# Patient Record
Sex: MALE | Race: WHITE | URBAN - METROPOLITAN AREA
[De-identification: names, ages, dates, MRNs, and addresses within clinical notes are randomized per-mention and may not be internally consistent; named-entity substitution may affect disease eponyms.]

---

## 2017-02-25 ENCOUNTER — HOSPITAL ENCOUNTER (INPATIENT)
Facility: CLINIC | Age: 27
LOS: 5 days | Discharge: HOME | DRG: 885 | End: 2017-03-02
Attending: EMERGENCY MEDICINE | Admitting: PSYCHIATRY & NEUROLOGY
Payer: COMMERCIAL

## 2017-02-25 DIAGNOSIS — F23 ACUTE PSYCHOSIS (H): ICD-10-CM

## 2017-02-25 DIAGNOSIS — F10.10 ALCOHOL ABUSE: ICD-10-CM

## 2017-02-25 PROBLEM — F32.A DEPRESSION: Status: ACTIVE | Noted: 2017-02-25

## 2017-02-25 PROCEDURE — 25000132 ZZH RX MED GY IP 250 OP 250 PS 637: Performed by: PSYCHIATRY & NEUROLOGY

## 2017-02-25 PROCEDURE — 12400006 ZZH R&B MH INTERMEDIATE

## 2017-02-25 PROCEDURE — 99285 EMERGENCY DEPT VISIT HI MDM: CPT | Mod: 25

## 2017-02-25 PROCEDURE — 90791 PSYCH DIAGNOSTIC EVALUATION: CPT

## 2017-02-25 RX ORDER — OLANZAPINE 10 MG/2ML
5-10 INJECTION, POWDER, FOR SOLUTION INTRAMUSCULAR EVERY 6 HOURS PRN
Status: DISCONTINUED | OUTPATIENT
Start: 2017-02-25 | End: 2017-02-27

## 2017-02-25 RX ORDER — HYDROXYZINE HYDROCHLORIDE 25 MG/1
25-50 TABLET, FILM COATED ORAL EVERY 4 HOURS PRN
Status: DISCONTINUED | OUTPATIENT
Start: 2017-02-25 | End: 2017-03-02 | Stop reason: HOSPADM

## 2017-02-25 RX ORDER — OLANZAPINE 5 MG/1
5 TABLET, ORALLY DISINTEGRATING ORAL 2 TIMES DAILY
Status: DISCONTINUED | OUTPATIENT
Start: 2017-02-25 | End: 2017-03-02 | Stop reason: HOSPADM

## 2017-02-25 RX ORDER — OLANZAPINE 5 MG/1
5-10 TABLET, ORALLY DISINTEGRATING ORAL EVERY 6 HOURS PRN
Status: DISCONTINUED | OUTPATIENT
Start: 2017-02-25 | End: 2017-02-27

## 2017-02-25 RX ADMIN — OLANZAPINE 5 MG: 5 TABLET, ORALLY DISINTEGRATING ORAL at 22:30

## 2017-02-25 RX ADMIN — OLANZAPINE 5 MG: 5 TABLET, ORALLY DISINTEGRATING ORAL at 11:47

## 2017-02-25 ASSESSMENT — ENCOUNTER SYMPTOMS
HALLUCINATIONS: 0
MYALGIAS: 0
COUGH: 0
FEVER: 0
HEADACHES: 0

## 2017-02-25 ASSESSMENT — ACTIVITIES OF DAILY LIVING (ADL)
GROOMING: INDEPENDENT
ORAL_HYGIENE: INDEPENDENT
DRESS: STREET CLOTHES
LAUNDRY: WITH SUPERVISION

## 2017-02-25 NOTE — IP AVS SNAPSHOT
[place] [address] [place] [address]    Phone:  [phone]                                       After Visit Summary   2/25/2017    Pepe Robertson    MRN: [id number]           After Visit Summary Signature Page     I have received my discharge instructions, and my questions have been answered. I have discussed any challenges I see with this plan with the nurse or doctor.    ..........................................................................................................................................  Patient/Patient Representative Signature      ..........................................................................................................................................  Patient Representative Print Name and Relationship to Patient    ..................................................               ................................................  Date                                            Time    ..........................................................................................................................................  Reviewed by Signature/Title    ...................................................              ..............................................  Date                                                            Time

## 2017-02-25 NOTE — PROGRESS NOTES
"Patient is guarded during interview and denies any psychosis,paranoia,depression,SI. And also states he drinks twice weekly 3-4 drinks at a time. Patient signed JOHN for his parents and mother states that \"Tiburcio\" lives with ,her, her  and his brother. Mother states that Tiburcio minimizes his symptoms and is in denial of his illness. She states that he drinks on a daily basis about 3-5 drinks. She also states he sees spirits and bullet holes in the wall and is fearful they will get shot. Also he has been talking about government conspiracies. Patient denies all of this. Pt took Zyprexa at 11:48 and has been compliant  "

## 2017-02-25 NOTE — PROGRESS NOTES
Boots w/string   Black sweat pants w/string   Green t-shirt  Gray hoodie w/string     Admission:    Name:____________________________________Date:_________________    Discharge:    Name:____________________________________Date:___________________

## 2017-02-25 NOTE — IP AVS SNAPSHOT
MRN:[id number]                      After Visit Summary   2/25/2017    Pepe Robertson    MRN: [id number]           Thank you!     Thank you for choosing [facility] for your care. Our goal is always to provide you with excellent care.        Patient Information     Date Of Birth          1990        About your hospital stay     You were admitted on:  February 25, 2017 You last received care in the:  [facility]    You were discharged on:  March 2, 2017       Who to Call     For medical emergencies, please call 911.  For non-urgent questions about your medical care, please call your primary care provider or clinic, None          Attending Provider     Provider Specialty    Hebert Abdalla MD Emergency Medicine    [facility]    Kate Garber MD Psychiatry       Primary Care Provider    Physician No Ref-Primary       No address on file        Further instructions from your care team       Behavioral Discharge Planning and Instructions    Summary: Admitted to hospital with paranoia and aggression toward his parents.    Main Diagnosis: Psychotic disorder, unspecified;alcohol use disorder, severe.     Major Treatments, Procedures and Findings: psychiatric assessment    Symptoms to Report: feeling more aggressive, increased confusion or mood getting worse    Lifestyle Adjustment: Sober lifestyle. Recommend AA attendance. Follow up for mental health needs with therapy and medication management.    Psychiatry Follow-up:    Therapy intake with Don Painter- appointment 3/13/17 (Monday) at 10:30 am. Bring insurance card or paper validation of insurance to first appointment.  Dr. Alcaraz, psychiatrist- appointment 4/27/17 (Thursday) at 10 am- 1 hour appointment.  [facility] [address] [place] [address] [phone] fax: [phone]      Resources:   Crisis Intervention: 895.610.1550 or 348-068-4413  "(TTY: 334.391.9650).  Call anytime for help.  National [place] on Mental Illness (www.[place].albert.org): 321.738.7403 or 060-963-9834.  Alcoholics Anonymous (www.alcoholics-anonymous.org): Check your phone book for your local chapter.  National Suicide Prevention Line (www.mentalhealthmn.org): 051-121-RQHU (5087)    General Medication Instructions:   See your medication sheet(s) for instructions.   Take all medicines as directed.  Make no changes unless your doctor suggests them.   Go to all your doctor visits.  Be sure to have all your required lab tests. This way, your medicines can be refilled on time.  Do not use any drugs not prescribed by your doctor.  Avoid alcohol.      Pending Results     No orders found from 2/23/2017 to 2/26/2017.            Statement of Approval     Ordered          03/01/17 1654  I have reviewed and agree with all the recommendations and orders detailed in this document.  EFFECTIVE NOW     Approved and electronically signed by:  Kate Garber MD             Admission Information     Date & Time Provider Department Dept. Phone    2/25/2017 Kate Garber MD [facility] [phone]      Your Vitals Were     Blood Pressure Pulse Temperature Respirations Height Weight    120/80 97 97.6  F (36.4  C) (Oral) 16 1.702 m (5' 7\") 82.5 kg (181 lb 12.8 oz)    Pulse Oximetry BMI (Body Mass Index)                98% 28.47 kg/m2          [employer] Information     [employer] lets you send messages to your doctor, view your test results, renew your prescriptions, schedule appointments and more. To sign up, go to [email or web address] . Click on \"Log in\" on the left side of the screen, which will take you to the Welcome page. Then click on \"Sign up Now\" on the right side of the page.     You will be asked to enter the access code listed below, as well as some personal information. Please follow the directions to create your username and password.     Your access " code is: [email or web address]  Expires: 2017  8:43 AM     Your access code will  in 90 days. If you need help or a new code, please call your [facility] clinic or [phone].        Care EveryWhere ID     This is your Care EveryWhere ID. This could be used by other organizations to access your [facility] medical records  [id number]           Review of your medicines      START taking        Dose / Directions    OLANZapine 10 MG tablet   Commonly known as:  zyPREXA   Used for:  Acute psychosis        Dose:  10 mg   Take 1 tablet (10 mg) by mouth At Bedtime   Quantity:  30 tablet   Refills:  1            Where to get your medicines      These medications were sent to [facility] - [address]  [address], [place] [address]     Phone:  [phone]     OLANZapine 10 MG tablet                Protect others around you: Learn how to safely use, store and throw away your medicines at www.disposemymeds.org.             Medication List: This is a list of all your medications and when to take them. Check marks below indicate your daily home schedule. Keep this list as a reference.      Medications           Morning Afternoon Evening Bedtime As Needed    OLANZapine 10 MG tablet   Commonly known as:  zyPREXA   Take 1 tablet (10 mg) by mouth At Bedtime

## 2017-02-25 NOTE — ED NOTES
Bed: [facility]  Expected date:   Expected time:   Means of arrival:   Comments:  533 26M hold/intox/psych eval to [facility]

## 2017-02-25 NOTE — ED PROVIDER NOTES
"  History     Chief Complaint:  Psychiatric Evaluation     HPI   Pepe Robertson is a 26 year old male who presents to the emergency department today for a pyschiatric evaluation. Per EMS report the police were called to the patient's home by the parents. They stated that the patient was paranoid, talking about government conspiracies, and that he was aggressive and pushing his parents this evening. Patient's parents note that he used to smoke marijuana but thought this made him paranoid so he was prescribed Abilify. However, he stopped taking the Abilify when he stopped smoking the marijuana. Here in the ED, when asked why he is here the patient states that \"he was drinking alcohol and screamed\" and was then brought here. He has no pain, fever, body aches or headaches. He has not had a cough or cold recently. The patient denies any paranoia this evening and instead states \"his mom thinks he's crazy as usual.\" He is not suicidal or homicidal. He denies auditory or visual hallucinations. He does not smoke or use recreational drugs. He reports a history of marijuana use but \"not in years.\" He notes hypertension runs in the family but not depression or anxiety. He is not feeling stressed or anxious. He states that he does not drink everyday and he has no history of withdrawal symptoms or seizures.     Allergies:  No Known Drug Allergies     Medications:    The patient is currently on no regular medications.    Past Medical History:    History reviewed. No pertinent past medical history.    Past Surgical History:    History reviewed. No pertinent past surgical history.    Family History:    History reviewed. No pertinent family history.     Social History:  The patient was accompanied to the ED by EMS.  Smoking Status: Negative  Alcohol Use: Positive  Marital Status:  Single [1]     Review of Systems   Constitutional: Negative for fever.   Respiratory: Negative for cough.    Musculoskeletal: Negative for myalgias. " "  Neurological: Negative for headaches.   Psychiatric/Behavioral: Negative for hallucinations, self-injury and suicidal ideas.   All other systems reviewed and are negative.    Physical Exam   Vitals:   Patient Vitals for the past 24 hrs:   BP Temp Temp src Heart Rate Resp SpO2 Height Weight   02/25/17 0516 123/76 - - 111 - 96 % - -   02/25/17 0112 (!) 126/91 98.6  F (37  C) Oral 126 18 95 % 1.702 m (5' 7\") 81.6 kg (180 lb)        Physical Exam    Constitutional:  Appears well-developed and well-nourished. Cooperative.   HENT:   Head:    Atraumatic.   Mouth/Throat:   Oropharynx is without erythema or exudate and mucous     membranes are moist.   Eyes:    Conjunctivae normal and EOM are normal.      Pupils are equal, round, and reactive to light. No photophobia  Neck:    Normal range of motion. Neck supple.   Cardiovascular:  tachy rate, regular rhythm, normal heart sounds and radial and    dorsalis pedis pulses are 2+ and symmetric.    Pulmonary/Chest:  Effort normal and breath sounds normal.   Abdominal:   Soft. Bowel sounds are normal.      No splenomegaly or hepatomegaly. No tenderness. No rebound.   Musculoskeletal:  Normal range of motion. No edema and no tenderness.   Neurological:  Alert. Normal strength. No cranial nerve deficit. GCS 15.  Skin:    Skin is warm and dry.   Psychiatric:   Normal mood and flat affect. Well groomed, good eye contact. Normal speech    Emergency Department Course     Emergency Department Course:  Nursing notes and vitals reviewed.  I performed an exam of the patient as documented above.   The patient provided a urine sample here in the emergency department. This was sent for laboratory testing, findings above.  I discussed the treatment plan with the patient. They expressed understanding of this plan and consented to admission. I discussed the patient with Dr. Chan, who will admit the patient to a monitored bed for further evaluation and treatment.    Impression & Plan  "     Medical Decision Making:  Patient presents acutely intoxicated with alcohol. He admits to heavy frequent alcohol use that he agrees is not healthy. Tonight his parents called the police because he was being physically aggressive and seemed paranoid and confused.     On my initial exam the patient is calm and cooperative. He denies hallucinations, thoughts of harming himself or others. Tonight he admits to alcohol use and denies street drug use. EMS relayed the concerns of the parents. I had the DEC  evaluate the patient and he was also calm and appropriate for her. DEC talked to the patient s family however, and they relayed concerning symptoms that suggest acute psychosis. There is a family history of psychiatric illness on the patient s mother s side and the patient has had previous hospitalizations for psychosis that was thought to be substance abuse induced.     The family is concerned both for their own safety should the patient return home and the patient s safety. The patient has been unwilling to participate in outpatient treatment or take medications. I think he is at risk for further decompensation and is unable to adequately care for himself. He has impaired judgement. I placed him on a 72 hour hold. He is accepted for hospitalization by Dr. Chan.     Diagnosis:    ICD-10-CM    1. Acute psychosis F23    2. Alcohol abuse F10.10      Disposition:   Admission to Dr. Chan    Scribe Disclosure:  I, Isis Iliavinicius, am serving as a scribe at 1:11 AM on 2/25/2017 to document services personally performed by Hebert Abdalla MD, based on my observations and the provider's statements to me.    2/25/2017    EMERGENCY DEPARTMENT       Hebert Abdalla MD  02/26/17 0429

## 2017-02-25 NOTE — H&P
"DATE OF ADMISSION:  02/25/2017.      DATE OF SERVICE:  02/25/2017.       IDENTIFYING DATA AND REASON FOR REFERRAL:  Pepe Robertson is a 26-year-old man who reports he is single with no children who resides in [place] with his parents and younger brother.  He was brought in by EMS at the insistence of his parents because he was displaying aggression and psychotic behavior at home.  He is admitted on a 72-hour hold that expires 03/02/2017 at midnight.      CHIEF COMPLAINT:  \"My parents just overreacted.\"      HISTORY OF PRESENT ILLNESS:  Pepe Robertson reports that he was hospitalized in [place], [place] for drug-induced psychosis in 04/2014.  At that time he was abusing marijuana heavily and experienced psychotic symptoms.  He states he was on Abilify 30 mg daily for about 4 months between April and 08/2013.  Since then he states he has been off antipsychotic medications.  He claims he no longer uses cannabis but he continues to drink alcohol.  He states he drinks vodka or beer at least 3-4 times a week and averages 2-5 drinks each time he drinks.  He states he does drink to intoxication at times but denies that he has ever been through withdrawal seizures or delirium tremens.  He states he has never been through chemical use treatment and has never obtained a DWI or DUI.  He states he has never received any consequences of his use and denies that his use of alcohol has increased over time.  He reports that his father had gotten upset with him yesterday because he was intoxicated with vodka and claims his father started screaming at him.  Per his report, he screamed back at his father and this is what led to the  being called.  He alleges that his parents tend to overreact and are intolerant of alcohol use in their home and that is typically the source of their conflicts.  He reports that his younger brother suffers from schizophrenia and his parents tend to assume that he suffers from the same thing.  " However, collateral information obtained by the patient's mother, Omaira Robertson suggested the patient has been displaying unusual behavior and reports seeing bizarre things for a while.  Per her report, he told them that he quit [employer] where he previously worked because they only hire schizophrenics.  She went on to report that he has been having conversations with ghosts and tends to swear at people that they do not see whom he describes as ghosts.  She reports that even as far back as Lucio and Brayden, he blew up at his family for no apparent reason and then told his mother to get in the car and get the dog into the car so that they can leave.  Mom reported that they did not own a dog.  He reportedly has also made statements about conversing with his sister named Soumya and his mom says that she only has 2 sons, the patient and his younger brother.  More recently, the patient has talked about listening to the playlist from someone who used to be their neighbor.  The patient insists that this patient is  but has a playlist and has composed music that he is listening to.  and has compose music that he is listening to.  He also told the police yesterday that there are bullet holes in his walls and claims that this is because there are people after him.  His mother reports that there is nothing in his walls other than pinholes from the previous owner of the house that are barely visible.  His mother reports that he may be stressed because they are currently remodeling their home.  She states that a few days ago his father had brought him to her job at a restaurant where she works and he started talking negatively derogatory about black people.  She states she was concerned because the  is black and she quickly got him out of there before things escalated.  He then went on to tell her to move back to [place] where she states she has never ever been in [place] in her entire life.  She reports  that the patient tends to display these behaviors when he is around family, but is able to curb his behaviors when others are around, but she states that this is becoming more difficult for him to do.  The patient insists that he does not experience any auditory or visual hallucinations.  He denies paranoia and just insists that his mother is overreacting.      PAST PSYCHIATRIC HISTORY:  As described above, the patient was admitted in the hospital in [place], [place] in 2013 and was on Abilify for about 4 months.      CHEMICAL USE HISTORY:  The patient admits to past abuse of marijuana.  He currently drinks alcohol to the point of intoxication up to 3-4 times a week.  He has never been through DTs or suffers alcohol withdrawal seizures.  He denies ever obtaining a DUI or DWI.      PAST MEDICAL HISTORY:  The patient reports being in good physical health and denies any chronic illnesses.      PAST SURGICAL HISTORY:  None reported.      ALLERGIES:  No known drug allergies.      FAMILY PSYCHIATRIC HISTORY:  The patient reports his brother suffers from schizophrenia and his mother's brother from bipolar disorder.      SOCIAL HISTORY:  The patient grew up in [place], [place].  He denies any history of physical, emotional or sexual abuse.  He states he did well in high school, played Lacrosse, graduated high school and went on to [employer] where he studied [employer].  He denies any  or criminal history.  He is currently unemployed.  He has never been  and has no children.  He resides with his parents.      REVIEW OF SYSTEMS:  A 10-point review of systems was negative apart from the pertinent positives in the history of present illness.      PHYSICAL EXAMINATION:   VITAL SIGNS:  Blood pressure 135/90, pulse 79, respirations 16, temperature 98.3, weight 181 pounds.      MENTAL STATUS EXAMINATION:  Pepe Robertson is a young man who appears his stated age of 26.  He is  dressed in hospital scrubs and ambulates on his own without difficulty.  He makes fair eye contact and speaks softly but clearly and coherently.  His mood is described as good and his affect is mood congruent.  There are no abnormal involuntary movements noted.  He does not endorse the presence of auditory or visual hallucinations.  He denies current self-harm thoughts or plans.  There are no delusions elicited.  He is alert and oriented to time, place and person.  His recall of recent and remote events is adequate.  He displays fair fund of knowledge.  His gait and station are within normal limits.  His language is appropriate.  His psychomotor behavior is regular.  His associations are concrete.  He displays limited insight and judgment.  Attention span and concentration are good.  His recent and remote memory is intact.  Risk assessment at this time is considered moderate on account of reported perceptual disturbance.      DIAGNOSTIC IMPRESSION:   1.  Psychotic disorder, unspecified.   2.  Alcohol use disorder, severe.   3.  History of substance-induced psychotic disorder.      PLAN:   1.  The patient will be maintained on the 72-hour hold on ITC.   2.  He will be placed on Zyprexa Zydis at 5 mg b.i.d.   3.  His mother has agreed to a family meeting on Monday at 10:00.  Estimated length of stay 5-7 days.         KARLI CALIX MD             D: 2017 12:24   T: 2017 13:14   MT: RAJAN      Name:     HUSSEIN CARCAMO   MRN:      [id number]        Account:      [id number]   :      1990           Admitted:     [id number]      Document: [id number]

## 2017-02-25 NOTE — PROGRESS NOTES
Patient brought in on a  Hold by Cj Ramirez bdg # [id number] file # [id number] of the [place] [employer] [phone]. This department will need to be called when a discharge order is placed and before the discharge and then fully documented in Epic.

## 2017-02-25 NOTE — PLAN OF CARE
Problem: Depressive Symptoms  Goal: Depressive Symptoms  Signs and symptoms of listed problems will be absent or manageable.  Outcome: No Change  Patient admitted from our ED to [facility]22. Brought in by parents after increasing paranoia and physical aggression towards them. Nursing assessment complete including patient and medication profiles. Risk assessments completed addressing suicide,fall,skin,nutrition and safety issues. Care plan initiated. Assessments reviewed with physician and admit orders received. .Welcome packet reviewed with patient. Information reviewed includes getting emergency help, preventing infections, understanding your care, using medication safely, reducing falls, preventing pressure ulcers, smoking cessation, powerful choices and Patients Bill of Rights. Pt. given tour of the unit and instruction on use of facility including emergency call light. Program schedule reviewed with patient. Questions regarding the unit addressed. Pt. Search completed and belongings inventoried.

## 2017-02-25 NOTE — ED NOTES
Denies pain at this time.  States has not had any hallucinations while here in the ED.  Advised patient his bed is ready.

## 2017-02-26 LAB
ANION GAP SERPL CALCULATED.3IONS-SCNC: 6 MMOL/L (ref 3–14)
BUN SERPL-MCNC: 16 MG/DL (ref 7–30)
CALCIUM SERPL-MCNC: 9.4 MG/DL (ref 8.5–10.1)
CHLORIDE SERPL-SCNC: 108 MMOL/L (ref 94–109)
CO2 SERPL-SCNC: 29 MMOL/L (ref 20–32)
CREAT SERPL-MCNC: 1.11 MG/DL (ref 0.66–1.25)
ERYTHROCYTE [DISTWIDTH] IN BLOOD BY AUTOMATED COUNT: 12.1 % (ref 10–15)
GFR SERPL CREATININE-BSD FRML MDRD: 80 ML/MIN/1.7M2
GLUCOSE SERPL-MCNC: 80 MG/DL (ref 70–99)
HCT VFR BLD AUTO: 48.4 % (ref 40–53)
HGB BLD-MCNC: 16.8 G/DL (ref 13.3–17.7)
MCH RBC QN AUTO: 32.6 PG (ref 26.5–33)
MCHC RBC AUTO-ENTMCNC: 34.7 G/DL (ref 31.5–36.5)
MCV RBC AUTO: 94 FL (ref 78–100)
PLATELET # BLD AUTO: 340 10E9/L (ref 150–450)
POTASSIUM SERPL-SCNC: 4.2 MMOL/L (ref 3.4–5.3)
RBC # BLD AUTO: 5.16 10E12/L (ref 4.4–5.9)
SODIUM SERPL-SCNC: 143 MMOL/L (ref 133–144)
TSH SERPL DL<=0.005 MIU/L-ACNC: 1.39 MU/L (ref 0.4–4)
WBC # BLD AUTO: 5.9 10E9/L (ref 4–11)

## 2017-02-26 PROCEDURE — 12400006 ZZH R&B MH INTERMEDIATE

## 2017-02-26 PROCEDURE — 85027 COMPLETE CBC AUTOMATED: CPT | Performed by: PSYCHIATRY & NEUROLOGY

## 2017-02-26 PROCEDURE — 25000132 ZZH RX MED GY IP 250 OP 250 PS 637: Performed by: PSYCHIATRY & NEUROLOGY

## 2017-02-26 PROCEDURE — 84443 ASSAY THYROID STIM HORMONE: CPT | Performed by: PSYCHIATRY & NEUROLOGY

## 2017-02-26 PROCEDURE — 80048 BASIC METABOLIC PNL TOTAL CA: CPT | Performed by: PSYCHIATRY & NEUROLOGY

## 2017-02-26 PROCEDURE — 36415 COLL VENOUS BLD VENIPUNCTURE: CPT | Performed by: PSYCHIATRY & NEUROLOGY

## 2017-02-26 RX ORDER — DIPHENHYDRAMINE HCL 25 MG
50 CAPSULE ORAL ONCE
Status: COMPLETED | OUTPATIENT
Start: 2017-02-26 | End: 2017-02-26

## 2017-02-26 RX ADMIN — OLANZAPINE 5 MG: 5 TABLET, ORALLY DISINTEGRATING ORAL at 08:14

## 2017-02-26 RX ADMIN — DIPHENHYDRAMINE HYDROCHLORIDE 50 MG: 25 CAPSULE ORAL at 23:07

## 2017-02-26 RX ADMIN — OLANZAPINE 10 MG: 5 TABLET, ORALLY DISINTEGRATING ORAL at 15:12

## 2017-02-26 RX ADMIN — OLANZAPINE 5 MG: 5 TABLET, ORALLY DISINTEGRATING ORAL at 21:33

## 2017-02-26 NOTE — PLAN OF CARE
Problem: Depressive Symptoms  Goal: Depressive Symptoms  Signs and symptoms of listed problems will be absent or manageable.   Outcome: No Change  Pt presents calm and with full affect during 1:1 with staff while reporting a 2 (0-10 scale) for anxiety and a 1 (0-10 scale) for depression.  Pt remained isolative to his room for this shift and stated that he  just needed to catch up on sleep.   Pt denies any psychosis, paranoia, or SI.

## 2017-02-26 NOTE — PLAN OF CARE
Problem: Depressive Symptoms  Goal: Depressive Symptoms  Signs and symptoms of listed problems will be absent or manageable.   Outcome: No Change  Pt was present on the unit but withdrawn. Pt appears preoccupied. Pt was encouraged to attend groups but refused stating that he is only because he is here on a 72 hour hold and does not want to be here and that he just was wants to watch TV. Pt was pleasant and cooperative.

## 2017-02-27 PROCEDURE — 25000132 ZZH RX MED GY IP 250 OP 250 PS 637: Performed by: PSYCHIATRY & NEUROLOGY

## 2017-02-27 PROCEDURE — 12400006 ZZH R&B MH INTERMEDIATE

## 2017-02-27 PROCEDURE — 90847 FAMILY PSYTX W/PT 50 MIN: CPT

## 2017-02-27 RX ORDER — OLANZAPINE 5 MG/1
5-10 TABLET ORAL EVERY 6 HOURS PRN
Status: DISCONTINUED | OUTPATIENT
Start: 2017-02-27 | End: 2017-03-02 | Stop reason: HOSPADM

## 2017-02-27 RX ORDER — OLANZAPINE 10 MG/2ML
5-10 INJECTION, POWDER, FOR SOLUTION INTRAMUSCULAR EVERY 6 HOURS PRN
Status: DISCONTINUED | OUTPATIENT
Start: 2017-02-27 | End: 2017-03-02 | Stop reason: HOSPADM

## 2017-02-27 RX ADMIN — OLANZAPINE 10 MG: 5 TABLET, ORALLY DISINTEGRATING ORAL at 01:01

## 2017-02-27 RX ADMIN — OLANZAPINE 5 MG: 5 TABLET, ORALLY DISINTEGRATING ORAL at 22:07

## 2017-02-27 RX ADMIN — OLANZAPINE 5 MG: 5 TABLET, ORALLY DISINTEGRATING ORAL at 08:25

## 2017-02-27 RX ADMIN — HYDROXYZINE HYDROCHLORIDE 50 MG: 25 TABLET ORAL at 04:11

## 2017-02-27 NOTE — PLAN OF CARE
Problem: Depressive Symptoms  Goal: Depressive Symptoms  Signs and symptoms of listed problems will be absent or manageable.   Outcome: Therapy, progress toward functional goals is gradual  Pt. Flat and Withdrawn. Denies having any mental health issues. States he just got into a fight with his father-will not discuss details. Denies hallucinations. Guarded with any delusions. Did admit to having previous issues with psychotic symptoms related to marijuana use. States he hasn't had these symptoms since 2014 when he stopped smoking.  States he will take the medication while in the hospital but will not continue after discharge. Refused to attend any milieu programming.
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
## 2017-02-27 NOTE — PLAN OF CARE
Problem: Depressive Symptoms  Goal: Depressive Symptoms  Signs and symptoms of listed problems will be absent or manageable.   Outcome: No Change  Pt displayed a blunt, flat affect. Mood is calm. Pt had a team meeting with parents which was primarily in regards to his alcohol use. Said he drinks two times a week and that is normal for his age. Pt denies thoughts of suicide or self harm. Besides the meeting with parents, pt spent the entire shift in his room laying down.

## 2017-02-28 PROCEDURE — 99222 1ST HOSP IP/OBS MODERATE 55: CPT | Performed by: PHYSICIAN ASSISTANT

## 2017-02-28 PROCEDURE — 12400006 ZZH R&B MH INTERMEDIATE

## 2017-02-28 PROCEDURE — 25000132 ZZH RX MED GY IP 250 OP 250 PS 637: Performed by: PSYCHIATRY & NEUROLOGY

## 2017-02-28 RX ADMIN — OLANZAPINE 5 MG: 5 TABLET, ORALLY DISINTEGRATING ORAL at 08:40

## 2017-02-28 RX ADMIN — OLANZAPINE 5 MG: 5 TABLET, ORALLY DISINTEGRATING ORAL at 21:04

## 2017-02-28 NOTE — PLAN OF CARE
Problem: Depressive Symptoms  Goal: Depressive Symptoms  Signs and symptoms of listed problems will be absent or manageable.   Outcome: No Change  Pt presents with a calm and quiet affect. Appears to be internally preoccupied. Visible around unit most of shift; spent time in the lounge watching tv while listening to music. Pt appears to be slow in responding to staff at times.

## 2017-02-28 NOTE — PLAN OF CARE
Problem: Depressive Symptoms  Goal: Depressive Symptoms  Signs and symptoms of listed problems will be absent or manageable.   Outcome: No Change  Pt has been spending time in her room bed resting through most of the shift. Pt has been out of his room for meals. Pt denies hearing voices but remains depressed and flat. Pt has poor eye contact with staff and peers and is slow in conversation. Pt is pleasant and cooperative with his tx plan.

## 2017-02-28 NOTE — PROGRESS NOTES
[facility] Psychiatric Progress Note      Interval History:   Pt seen, team meeting held with , nursing staff, OT, and PA's to review diagnosis and treatment plan. Staff report that patient continues to isolate himself. He reports that he slept better. He denies self harm thoughts or plans. Tolerating medications well without significant side effects. He denies the presence of auditory or visual hallucinations.     Review of systems:   The Review of Systems is negative other than noted in the HPI     Medications:       OLANZapine zydis  5 mg Oral BID     OLANZapine **OR** OLANZapine, hydrOXYzine    Mental Status Examination:     Appearance:  awake, alert, adequately groomed and casually dressed  Eye Contact:  better  Speech:  clear, coherent  Psychomotor Behavior:  no evidence of tardive dyskinesia, dystonia, or tics and fidgeting  Mood:  Better  Affect:  appropriate and in normal range and intensity is normal  Thought Process:  logical, linear and goal oriented no loose associations  Thought Content:  no evidence of suicidal ideation or homicidal ideation. Denies A/V hallucinations.   Oriented to:  time, person, and place  Attention Span and Concentration:  limited  Recent and Remote Memory:  limited  Fund of Knowledge: appropriate  Muscle Strength and Tone: normal  Gait and Station: Normal  Insight:  fair  Judgment:  limited          Labs/Vitals:   No results found for this or any previous visit (from the past 24 hour(s)).  B/P: 121/87, T: 97.9, P: Data Unavailable, R: 16    Impression:   This is a 26-year-old man who was brought into the hospital by EMS on account of agitated and delusional behavior at home.  He had also been consuming large amounts of alcohol.  He has a history of drug-induced psychotic disorder.      DIagnoses:     1. Psychotic disorder, unspecified.   2. Alcohol use disorder, severe.   3. History of substance-induced psychotic disorder.          Plan:   1. Written  information given on medications. Side effects, risks, benefits reviewed.  2.  Maintain current medications without changes  3.  Maintain on 72 hour hold.      Attestation:  Patient has been seen and evaluated by Kate jo MD    PATIENT ID  Name: Pepe Robertson  MRN:[id number]  YOB: 1990
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
## 2017-02-28 NOTE — PLAN OF CARE
Problem: Depressive Symptoms  Goal: Depressive Symptoms  Signs and symptoms of listed problems will be absent or manageable.   Patient isolated to room in AM, has flat tense affect. Pt did come out in lounge after lunch but is not comfortable with the milieu . On 1:1 pt states she is doing ok but would like to move to SDU. Pt did go to Spirituality group. 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 I asked her why this is and she stated its all sciences and had had vague answer. She asked for pain medications and received Norco at 1430 for this. Pain level # 7

## 2017-03-01 PROCEDURE — 12400006 ZZH R&B MH INTERMEDIATE

## 2017-03-01 PROCEDURE — 25000132 ZZH RX MED GY IP 250 OP 250 PS 637: Performed by: PSYCHIATRY & NEUROLOGY

## 2017-03-01 PROCEDURE — 97150 GROUP THERAPEUTIC PROCEDURES: CPT | Mod: GO

## 2017-03-01 RX ORDER — OLANZAPINE 10 MG/1
10 TABLET ORAL AT BEDTIME
Qty: 30 TABLET | Refills: 1 | Status: ON HOLD | OUTPATIENT
Start: 2017-03-01 | End: 2017-08-25

## 2017-03-01 RX ADMIN — OLANZAPINE 5 MG: 5 TABLET, ORALLY DISINTEGRATING ORAL at 09:13

## 2017-03-01 RX ADMIN — OLANZAPINE 5 MG: 5 TABLET, ORALLY DISINTEGRATING ORAL at 21:32

## 2017-03-01 NOTE — PROGRESS NOTES
[facility] Psychiatric Progress Note      Interval History:   Pt seen, team meeting held with , nursing staff, OT, and PA's to review diagnosis and treatment plan. Staff reports he remains about the same and it appears he is just complying with recommendations till he can get out of here. He does not report the presence of auditory or visual hallucinations. He did not experience any withdrawal symptoms. He says he would like to go home tomorrow morning as his hold expires at midnight. Denies suicidal or homicidal ideation. Tolerating medications well without significant side effects. States he will stay on his current medications.     Review of systems:   The Review of Systems is negative other than noted in the HPI     Medications:       OLANZapine zydis  5 mg Oral BID     OLANZapine **OR** OLANZapine, hydrOXYzine    Mental Status Examination:     Appearance:  awake, alert, adequately groomed and casually dressed  Eye Contact:  better  Speech:  clear, coherent  Psychomotor Behavior:  no evidence of tardive dyskinesia, dystonia, or tics and fidgeting  Mood:  Better  Affect:  appropriate and in normal range and intensity is normal  Thought Process:  logical, linear and goal oriented no loose associations  Thought Content:  no evidence of suicidal ideation or homicidal ideation. Denies A/V hallucinations.   Oriented to:  time, person, and place  Attention Span and Concentration:  limited  Recent and Remote Memory:  limited  Fund of Knowledge: appropriate  Muscle Strength and Tone: normal  Gait and Station: Normal  Insight:  fair  Judgment:  limited          Labs/Vitals:   No results found for this or any previous visit (from the past 24 hour(s)).  B/P: 121/87, T: 97.9, P: Data Unavailable, R: 16    Impression:   This is a 26-year-old man who was brought into the hospital by EMS on account of agitated and delusional behavior at home.  He had also been consuming large amounts of alcohol.   He has a history of drug-induced psychotic disorder.      DIagnoses:     1. Psychotic disorder, unspecified.   2. Alcohol use disorder, severe.   3. History of substance-induced psychotic disorder.          Plan:   1. Written information given on medications. Side effects, risks, benefits reviewed.  2.  Maintain current medications without changes  3.  May discharge after hold expires.     Attestation:  Patient has been seen and evaluated by Kate jo MD    PATIENT ID  Name: Pepe Robertson  MRN:[id number]  YOB: 1990

## 2017-03-01 NOTE — PLAN OF CARE
Problem: Goal Outcome Summary  Goal: Goal Outcome Summary  OT: Initial OT eval during afternoon group. Pt attended his first OT group since admitted to the unit. Pt offered appropriate ideas to the group conversation regarding stress and stress management. Pt appeared to enjoy the meditation portion of the Life Skills group.     OT: Despite encouragement, pt declined to participate in exercise group this afternoon. Pt intently observed group from opposite side of room.
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
## 2017-03-01 NOTE — PLAN OF CARE
Problem: Depressive Symptoms  Goal: Depressive Symptoms  Signs and symptoms of listed problems will be absent or manageable.   Outcome: No Change  Pt visible around unit; spent time watching tv in the lounge. Appears to be internally stimulated and confused at times. Presents with a calm and flat affect. During one-on-one pt enjoyed talking about movies, but did not go into much detail; had poor eye contact while talking to staff.

## 2017-03-01 NOTE — PLAN OF CARE
Problem: Depressive Symptoms  Goal: Depressive Symptoms  Signs and symptoms of listed problems will be absent or manageable.   Outcome: No Change  Pt has been spending time mostly keeping to himself and withdrawn. Pt is very superficial with staff and does not engage in conversation but does respond appropriately when approached. Pt is pleasant and cooperative. Pt plans on being DC tomorrow morning and is not sure about any follow up plans. Pt is encouraged to speak to the Dr. About his DC plans tomorrow and set up a ride for himself. Pt is not sure about his medicine being effective but denies any psychosis. Pt is pleasant and cooperative.

## 2017-03-02 VITALS
SYSTOLIC BLOOD PRESSURE: 120 MMHG | TEMPERATURE: 97.6 F | HEART RATE: 97 BPM | BODY MASS INDEX: 28.53 KG/M2 | RESPIRATION RATE: 16 BRPM | HEIGHT: 67 IN | DIASTOLIC BLOOD PRESSURE: 80 MMHG | WEIGHT: 181.8 LBS | OXYGEN SATURATION: 98 %

## 2017-03-02 PROCEDURE — 25000132 ZZH RX MED GY IP 250 OP 250 PS 637: Performed by: PSYCHIATRY & NEUROLOGY

## 2017-03-02 RX ADMIN — OLANZAPINE 5 MG: 5 TABLET, ORALLY DISINTEGRATING ORAL at 08:28

## 2017-03-02 NOTE — PLAN OF CARE
Problem: Discharge Planning  Goal: Discharge Planning (Adult, OB, Behavioral, Peds)  Outcome: Adequate for Discharge Date Met:  03/02/17  Pt. Blunt, flat, calm and cooperative. Denies experiencing any hallucinations and does not appear responding. Denies SI. Pt. Discharging home, calling for a ride from parents. Reviewed discharge medications, follow up appointment/plan, resources for support, and when to seek medical attention.

## 2017-03-02 NOTE — PLAN OF CARE
Problem: Depressive Symptoms  Goal: Depressive Symptoms  Signs and symptoms of listed problems will be absent or manageable.   Outcome: No Change  Patient was present in the ITC and SDU. He spent most of the time in the SDU as it was less overwhelming on that side. Affect was flat/blunt and mood appeared depressed. Pt attended groups and participated appropriately. He  was pleasant and cooperative with staff. Pt watched some lacrosse on TV when in the ITC. Pt reports he hasn't talked much with peers in the SDU. He talked about getting a scholarship through lacrosse to go to college. After some years in college he wasn't feeling the motivation to continue and decided to drop out. Pt reported he doesn't have many friends in [place] since the college he went to was in [place] where he made some friends at. Waiting for possible discharge tomorrow.
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
## 2017-03-02 NOTE — PROGRESS NOTES
Left a message with the  of the [place] police department that the patient was being discharged from the hospital today. Provided Pt. Name, , file # and call back # to unit.

## 2017-03-13 NOTE — DISCHARGE SUMMARY
"DATE OF ADMISSION:  2017.      DATE OF DISCHARGE:  2017.      PRIMARY CARE PHYSICIAN:  None given.      DISCHARGE DIAGNOSES:   1.  Psychotic disorder, unspecified.   2.  Alcohol use disorder, severe.   3.  History of substance-induced psychotic disorder.      REASON FOR REFERRAL:  Pepe Robertson is a 26-year-old single father of none who resides in [place] with his parents and younger brother who was brought in by EMS at the insistence of his parents because he was displaying aggression and psychotic behavior at home.  He was admitted on a 72-hour hold that  on 2017 at midnight.      CHIEF COMPLAINT:  \"My parents just overreacted.\"      HISTORY OF PRESENT ILLNESS:  I refer the reader to the psychiatric evaluation documented on 2017 by Kate Garber MD in addition to the history and physical examination completed on  by Gena Stringer PA-C and attested by Ellie Andersen MD.  I also refer to the case management psychosocial assessment documented by Lisa Nguyen,  on 2017.      HOSPITAL COURSE:  Following admission to the mental health unit, the patient was remanded on the [facility].  Collateral information was gathered from his parents as he minimized his presenting symptoms.  Consequently, a family meeting was held the day after he was admitted because the patient was denying any of the symptoms that had been described in the admission notes.  During the family meeting, it became apparent that the patient was drinking excessive amounts of alcohol to the extent that he was buying a keg of alcohol for individual consumption.  He and his mother reportedly did not get along on account of his behaviors.  He reportedly would respond to auditory hallucinations which his parents describe as him talking to ghosts.  He reportedly would intermittently get agitated to the extent that he sometimes will punch walls.  In the family meeting his father minimized his " symptoms and admitted that he had been acting bizarre for over a year, but he had been watching him closely, whereas the mother felt he needed help.  The father appeared to be very permissive in the context of the interview and seemed to placate the patient often during the interview rather than confront his behaviors so he could get the help he needed.  Ultimately, he did not think the patient needed chemical use treatment, but they were advised that the patient will have to be held on the 72-hour hold until he was deemed stable enough for discharge.  The patient ultimately agreed to comply with the recommendations of the treatment team, even though he did not feel he needed any medications.  He agreed to take Zyprexa Zydis as prescribed at 5 mg twice daily as he had been on this medication during a prior hospitalization.  He kept to himself during the hospitalization and did not display any agitation or aggression toward others.  He watched television with his peers on the unit, but was very quiet and isolative.  His hemogram and BMP were within normal limits.  Once his 72-hour hold , the patient requested to be discharged.  He was picked up by his parents.      DISCHARGE MEDICATION:  Zyprexa 10 mg p.o. each day at bedtime.      DISPOSITION:  The patient was advised to follow up at [facility] with Don Painter, PhD, LMFT; MA, LP on 2017 at 10:30 a.m. and with Dr. Alcaraz, psychiatrist, on 2017 at 10:00 a.m.      TIME SPENT:  35 minutes with more than 50% of the time spent in counseling and care coordination.         KARLI CALIX MD             D: 2017 20:48   T: 2017 22:33   MT: JOSE      Name:     HUSSEIN CARCAMO   MRN:      [id number]        Account:        [id number]   :      1990           Admit Date:                                       Discharge Date: 2017      Document: [id number]

## 2017-08-11 ENCOUNTER — HOSPITAL ENCOUNTER (EMERGENCY)
Facility: CLINIC | Age: 27
Discharge: HOME | End: 2017-08-12
Attending: EMERGENCY MEDICINE | Admitting: EMERGENCY MEDICINE
Payer: COMMERCIAL

## 2017-08-11 DIAGNOSIS — R46.89 AGGRESSIVE BEHAVIOR: ICD-10-CM

## 2017-08-11 DIAGNOSIS — F29 PSYCHOSIS, UNSPECIFIED PSYCHOSIS TYPE (H): ICD-10-CM

## 2017-08-11 DIAGNOSIS — F10.10 ALCOHOL ABUSE: ICD-10-CM

## 2017-08-11 LAB
AMPHETAMINES UR QL SCN: NORMAL
BARBITURATES UR QL: NORMAL
BENZODIAZ UR QL: NORMAL
CANNABINOIDS UR QL SCN: NORMAL
COCAINE UR QL: NORMAL
OPIATES UR QL SCN: NORMAL
PCP UR QL SCN: NORMAL

## 2017-08-11 PROCEDURE — 80307 DRUG TEST PRSMV CHEM ANLYZR: CPT | Performed by: EMERGENCY MEDICINE

## 2017-08-11 PROCEDURE — 99285 EMERGENCY DEPT VISIT HI MDM: CPT | Mod: 25

## 2017-08-11 PROCEDURE — 90791 PSYCH DIAGNOSTIC EVALUATION: CPT

## 2017-08-12 ENCOUNTER — HOSPITAL ENCOUNTER (INPATIENT)
Facility: CLINIC | Age: 27
LOS: 33 days | Discharge: PSYCHIATRIC OR SUBSTANCE USE FACILITY | DRG: 885 | End: 2017-09-14
Attending: PSYCHIATRY & NEUROLOGY | Admitting: PSYCHIATRY & NEUROLOGY
Payer: COMMERCIAL

## 2017-08-12 VITALS
DIASTOLIC BLOOD PRESSURE: 100 MMHG | BODY MASS INDEX: 28.25 KG/M2 | RESPIRATION RATE: 17 BRPM | TEMPERATURE: 97.9 F | WEIGHT: 180 LBS | OXYGEN SATURATION: 98 % | SYSTOLIC BLOOD PRESSURE: 148 MMHG | HEIGHT: 67 IN

## 2017-08-12 DIAGNOSIS — G25.71 AKATHISIA: ICD-10-CM

## 2017-08-12 DIAGNOSIS — F51.01 PRIMARY INSOMNIA: ICD-10-CM

## 2017-08-12 DIAGNOSIS — F41.9 ANXIETY: ICD-10-CM

## 2017-08-12 DIAGNOSIS — L98.9 SKIN LESION: Primary | ICD-10-CM

## 2017-08-12 DIAGNOSIS — F29 PSYCHOSIS, UNSPECIFIED PSYCHOSIS TYPE (H): ICD-10-CM

## 2017-08-12 PROCEDURE — 12400003 ZZH R&B MH CRITICAL UMMC

## 2017-08-12 PROCEDURE — 99222 1ST HOSP IP/OBS MODERATE 55: CPT | Mod: AI | Performed by: CLINICAL NURSE SPECIALIST

## 2017-08-12 RX ORDER — OLANZAPINE 10 MG/1
10 TABLET ORAL
Status: DISCONTINUED | OUTPATIENT
Start: 2017-08-12 | End: 2017-09-14 | Stop reason: HOSPADM

## 2017-08-12 RX ORDER — HYDROXYZINE HYDROCHLORIDE 25 MG/1
25-50 TABLET, FILM COATED ORAL EVERY 4 HOURS PRN
Status: DISCONTINUED | OUTPATIENT
Start: 2017-08-12 | End: 2017-09-14 | Stop reason: HOSPADM

## 2017-08-12 RX ORDER — BISACODYL 10 MG
10 SUPPOSITORY, RECTAL RECTAL DAILY PRN
Status: DISCONTINUED | OUTPATIENT
Start: 2017-08-12 | End: 2017-09-14 | Stop reason: HOSPADM

## 2017-08-12 RX ORDER — DIAZEPAM 5 MG
5-20 TABLET ORAL EVERY 30 MIN PRN
Status: DISCONTINUED | OUTPATIENT
Start: 2017-08-12 | End: 2017-08-14

## 2017-08-12 RX ORDER — OLANZAPINE 10 MG/2ML
10 INJECTION, POWDER, FOR SOLUTION INTRAMUSCULAR
Status: DISCONTINUED | OUTPATIENT
Start: 2017-08-12 | End: 2017-09-14 | Stop reason: HOSPADM

## 2017-08-12 RX ORDER — OLANZAPINE 10 MG/1
10 TABLET, ORALLY DISINTEGRATING ORAL AT BEDTIME
Status: DISCONTINUED | OUTPATIENT
Start: 2017-08-12 | End: 2017-08-24

## 2017-08-12 RX ORDER — TRAZODONE HYDROCHLORIDE 50 MG/1
50 TABLET, FILM COATED ORAL
Status: DISCONTINUED | OUTPATIENT
Start: 2017-08-12 | End: 2017-09-14 | Stop reason: HOSPADM

## 2017-08-12 RX ORDER — ACETAMINOPHEN 325 MG/1
650 TABLET ORAL EVERY 4 HOURS PRN
Status: DISCONTINUED | OUTPATIENT
Start: 2017-08-12 | End: 2017-09-14 | Stop reason: HOSPADM

## 2017-08-12 RX ORDER — ALUMINA, MAGNESIA, AND SIMETHICONE 2400; 2400; 240 MG/30ML; MG/30ML; MG/30ML
30 SUSPENSION ORAL EVERY 4 HOURS PRN
Status: DISCONTINUED | OUTPATIENT
Start: 2017-08-12 | End: 2017-09-14 | Stop reason: HOSPADM

## 2017-08-12 ASSESSMENT — ENCOUNTER SYMPTOMS
RHINORRHEA: 0
COUGH: 0
FEVER: 0
VOMITING: 0
WOUND: 1
NAUSEA: 0
NECK PAIN: 0
HEADACHES: 0

## 2017-08-12 ASSESSMENT — ACTIVITIES OF DAILY LIVING (ADL)
COGNITION: 0 - NO COGNITION ISSUES REPORTED
TRANSFERRING: 0-->INDEPENDENT
AMBULATION: 0-->INDEPENDENT
FALL_HISTORY_WITHIN_LAST_SIX_MONTHS: NO
RETIRED_COMMUNICATION: 0-->UNDERSTANDS/COMMUNICATES WITHOUT DIFFICULTY
DRESS: 0-->INDEPENDENT
BATHING: 0-->INDEPENDENT
GROOMING: INDEPENDENT
TOILETING: 0-->INDEPENDENT
SWALLOWING: 0-->SWALLOWS FOODS/LIQUIDS WITHOUT DIFFICULTY
RETIRED_EATING: 0-->INDEPENDENT
LAUNDRY: UNABLE TO COMPLETE
ORAL_HYGIENE: INDEPENDENT
DRESS: SCRUBS (BEHAVIORAL HEALTH)

## 2017-08-12 NOTE — PROGRESS NOTES
08/12/17 0454   Patient Belongings   Did you bring any home meds/supplements to the hospital?  No   Patient Belongings cell phone/electronics;clothing;shoes   Disposition of Belongings All items in locker. No ID, Credit cards, Cash or medications   Belongings Search Yes   Clothing Search Yes   Second Staff RH     Orange shorts  Gray shirt  White shoes  Cell Phone    Brought by parents 8/13/2017 - all in locker:  T shirt  Socks  Underwear   [place] Drivers liscence [id number]  [facility] card [id number]  [facility] card [id number]  ADMISSION:  I am responsible for any personal items that are not sent to the safe or pharmacy. [facility] is not responsible for loss, theft or damage of any property in my possession.    Patient Signature _____________________ Date/Time _____________________    Staff Signature _______________________ Date/Time _____________________    2nd Staff person, if patient is unable/unwilling to sign  ___________________________________ Date/Time _____________________  DISCHARGE:  All personal items have been returned to me.    Patient Signature _____________________ Date/Time _____________________    Staff Signature _______________________ Date/Time _____________________
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
## 2017-08-12 NOTE — LETTER
To Whom it may concern:      Pepe Robertson was hospitalized at the [facility] from 08/12/2017 through 09/14/2017. Please call with any questions.    Sincerely,  Jamey Babin MD

## 2017-08-12 NOTE — ED NOTES
Bed: [facility]  Expected date:   Expected time:   Means of arrival:   Comments:  [phone] Psych eval
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
## 2017-08-12 NOTE — IP AVS SNAPSHOT
[address] [address]    Phone:  [phone]                                       After Visit Summary   8/12/2017    Pepe Robertson    MRN: [id number]           After Visit Summary Signature Page     I have received my discharge instructions, and my questions have been answered. I have discussed any challenges I see with this plan with the nurse or doctor.    ..........................................................................................................................................  Patient/Patient Representative Signature      ..........................................................................................................................................  Patient Representative Print Name and Relationship to Patient    ..................................................               ................................................  Date                                            Time    ..........................................................................................................................................  Reviewed by Signature/Title    ...................................................              ..............................................  Date                                                            Time

## 2017-08-12 NOTE — ED NOTES
Report received. Care of patient assumed. Patient sitting in [facility] area drinking water. Respirations are regular and unlabored. Patient is calm and cooperative. Continue to monitor.

## 2017-08-12 NOTE — ED NOTES
Patient updated that he will be transferred to [facility].  He was updated on 72 hour hold and given Patient Rights form.  All questions answered.  Pt agreeable to plan.
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
## 2017-08-12 NOTE — IP AVS SNAPSHOT
MRN:[id number]                      After Visit Summary   8/12/2017    Pepe Robertson    MRN: [id number]           Thank you!     Thank you for choosing [facility] for your care. Our goal is always to provide you with excellent care.        Patient Information     Date Of Birth          1990        Designated Caregiver       Most Recent Value    Caregiver    Will someone help with your care after discharge? no      About your hospital stay     You were admitted on:  August 12, 2017 You last received care in the:   [facility]    You were discharged on:  September 14, 2017       Who to Call     For medical emergencies, please call 911.  For non-urgent questions about your medical care, please call your primary care provider or clinic, None          Attending Provider     Provider Specialty    Yasmany Simpson MD Psychiatry    Jorge Peterson MD Psychiatry    Keenan Maravilla MD Psychiatry       Primary Care Provider    Physician No Ref-Primary      Additional Services     Dermatology Referral                 Further instructions from your care team        Behavioral Discharge Planning and Instructions      Summary:  You were admitted on 8/12/2017  due to Psychotic Symptomology.  You were admitted to station 12 and transferred to station 22 on 8/26/17 for ongoing treatment and discharge planning.  You discharged on 9/14/17 from Station 22 to [facility]    While hospitalized a petition for commitment as a person with mental illness and a petition to authorize treatment with neuroleptics (Alfredo) was filed with [facility]. On August 31, 2017 the court issued and order for committment and Alfredo. Today you will discharge with a Provisional Discharge Agreement which is effective for the duration of your commitment order - which is to last minimally until 2/28/18 unless this date is changed by the courts prior to.     Principal Diagnosis: Psychosis NEC      Health Care Follow-up  Appointments:       Thursday September 14, 2017 - [facility]idential [facility] (IRTS)  [address] [address] phone: [phone] fax: [phone]      Wednesday September 20, 2017 - 1:00pm Psychiatry  - Bob Spivey, PMHNP -BS  [facility] - [address]  [place] [address]  Phone:[phone]   Fax: [phone]      Linda Valverde, [facility]       Phone:[phone]   Fax: [phone]   Continue working with your  for ongoing access to resources, mental health goals and to monitor your commitment.       [facility] [place] Appointment Transportation Service   [employer] phone: [phone]    Attend all scheduled appointments with your outpatient providers. Call at least 24 hours in advance if you need to reschedule an appointment to ensure continued access to your outpatient providers.   Major Treatments, Procedures and Findings:  You were provided with: a psychiatric assessment, medication evaluation and/or management, group therapy, milieu management and medical interventions    Symptoms to Report: feeling more aggressive, increased confusion, losing more sleep, mood getting worse or thoughts of suicide    Early warning signs can include: increased depression or anxiety sleep disturbances increased thoughts or behaviors of suicide or self-harm  increased unusual thinking, such as paranoia or hearing voices    Safety and Wellness:  Take all medicines as directed.  Make no changes unless your doctor suggests them.      Follow treatment recommendations.  Refrain from alcohol and non-prescribed drugs.  If there is a concern for safety, call 911.    Resources:   Crisis Intervention: [phone] or [phone] (TTY: [phone]).  Call anytime for help.  [facility] Crisis (COPE) Response - Adult [phone]    The treatment team has appreciated the opportunity to work  "with you.     If you have any questions or concerns our unit number is [phone]         Pending Results     No orders found from 8/10/2017 to 2017.            Statement of Approval     Ordered          17 0951  I have reviewed and agree with all the recommendations and orders detailed in this document.  EFFECTIVE NOW     Approved and electronically signed by:  Jamey Babin MD             Admission Information     Date & Time Provider Department Dept. Phone    2017 Keenan Maravilla MD  [facility] [phone]      Your Vitals Were     Blood Pressure Pulse Temperature Respirations Height Weight    120/72 88 97.3  F (36.3  C) (Tympanic) 16 1.702 m (5' 7\") 84.8 kg (187 lb)    Pulse Oximetry BMI (Body Mass Index)                95% 29.29 kg/m2          MyChart Information     [facility] lets you send messages to your doctor, view your test results, renew your prescriptions, schedule appointments and more. To sign up, go to [email or web address] . Click on \"Log in\" on the left side of the screen, which will take you to the Welcome page. Then click on \"Sign up Now\" on the right side of the page.     You will be asked to enter the access code listed below, as well as some personal information. Please follow the directions to create your username and password.     Your access code is: [email or web address]  Expires: 2017 10:01 AM     Your access code will  in 90 days. If you need help or a new code, please call your [place] clinic or [phone].        Care EveryWhere ID     This is your Care EveryWhere ID. This could be used by other organizations to access your [place] medical records  [id number]        Equal Access to Services     [facility] AH: Hadii rosalee Fierro, waaxda luqadaha, qaybta kaalmada henrry, kyle sousa. So [facility] [phone].    ATENCIÓN: Si habla español, tiene a diana disposición servicios gratuitos de asistencia lingüística. Llame al " [phone].    We comply with applicable federal civil rights laws and [place] laws. We do not discriminate on the basis of race, color, national origin, age, disability sex, sexual orientation or gender identity.               Review of your medicines      START taking        Dose / Directions    hydrOXYzine 25 MG tablet   Commonly known as:  ATARAX   Used for:  Anxiety        Dose:  25-50 mg   Take 1-2 tablets (25-50 mg) by mouth every 4 hours as needed for anxiety   Quantity:  60 tablet   Refills:  0       * propranolol HCl 60 MG Tabs   Used for:  Akathisia        Dose:  15 mg   Take 0.25 tablets (15 mg) by mouth 2 times daily as needed (akathisia)   Quantity:  30 tablet   Refills:  0       * propranolol 20 MG tablet   Commonly known as:  INDERAL   Used for:  Akathisia        Dose:  20 mg   Take 1 tablet (20 mg) by mouth nightly as needed (akathisia)   Quantity:  30 tablet   Refills:  0       * risperiDONE 1 MG tablet   Commonly known as:  risperDAL        Dose:  1 mg   Take 1 tablet (1 mg) by mouth 4 times daily as needed (Psychosis)   Quantity:  10 tablet   Refills:  0       * risperiDONE 3 MG tablet   Commonly known as:  risperDAL        Dose:  3 mg   Take 1 tablet (3 mg) by mouth At Bedtime for 21 days   Quantity:  21 tablet   Refills:  0       risperiDONE microspheres 25 MG injection   Commonly known as:  risperDAL CONSTA        Dose:  25 mg   Inject 2 mLs (25 mg) into the muscle every 14 days   Quantity:  1 each   Refills:  0       traZODone 50 MG tablet   Commonly known as:  DESYREL   Used for:  Primary insomnia        Dose:  50 mg   Take 1 tablet (50 mg) by mouth nightly as needed for sleep   Quantity:  30 tablet   Refills:  0       * Notice:  This list has 4 medication(s) that are the same as other medications prescribed for you. Read the directions carefully, and ask your doctor or other care provider to review them with you.         Where to get your medicines      These medications were sent to  [facility] [place], [place] - [address]  [address], [facility] [address]     Phone:  [phone]     hydrOXYzine 25 MG tablet    propranolol 20 MG tablet    propranolol HCl 60 MG Tabs    risperiDONE 1 MG tablet    risperiDONE 3 MG tablet    traZODone 50 MG tablet         These medications were sent to [facility] - [place], [place] - [address]  [address], [facility] [address]     Phone:  [phone]     risperiDONE microspheres 25 MG injection                Protect others around you: Learn how to safely use, store and throw away your medicines at www.disposemymeds.org.             Medication List: This is a list of all your medications and when to take them. Check marks below indicate your daily home schedule. Keep this list as a reference.      Medications           Morning Afternoon Evening Bedtime As Needed    hydrOXYzine 25 MG tablet   Commonly known as:  ATARAX   Take 1-2 tablets (25-50 mg) by mouth every 4 hours as needed for anxiety   Last time this was given:  50 mg on 9/14/2017 12:33 AM                                * propranolol HCl 60 MG Tabs   Take 0.25 tablets (15 mg) by mouth 2 times daily as needed (akathisia)   Last time this was given:  15 mg on 9/14/2017  9:37 AM                                * propranolol 20 MG tablet   Commonly known as:  INDERAL   Take 1 tablet (20 mg) by mouth nightly as needed (akathisia)   Last time this was given:  15 mg on 9/14/2017  9:37 AM                                * risperiDONE 1 MG tablet   Commonly known as:  risperDAL   Take 1 tablet (1 mg) by mouth 4 times daily as needed (Psychosis)   Last time this was given:  3 mg on 9/13/2017  9:16 PM                                * risperiDONE 3 MG tablet   Commonly known as:  risperDAL   Take 1 tablet (3 mg) by mouth At Bedtime for 21 days   Last time this was given:  3 mg on 9/13/2017  9:16 PM                                risperiDONE microspheres 25 MG  injection   Commonly known as:  risperDAL CONSTA   Inject 2 mLs (25 mg) into the muscle every 14 days   Last time this was given:  25 mg on 9/11/2017  4:29 PM                                traZODone 50 MG tablet   Commonly known as:  DESYREL   Take 1 tablet (50 mg) by mouth nightly as needed for sleep   Last time this was given:  50 mg on 9/14/2017 12:33 AM                                * Notice:  This list has 4 medication(s) that are the same as other medications prescribed for you. Read the directions carefully, and ask your doctor or other care provider to review them with you.

## 2017-08-12 NOTE — PLAN OF CARE
Problem: Psychotic Symptoms  Goal: Psychotic Symptoms  Signs and symptoms of listed problems will be absent or manageable.  Outcome: No Change  RN ASSESSMENT   Patient presents visible in the milieu, but isolative to self. When in the lounge keeps to himself , however laughing inappropriately most of the time. Patient also is laughing to self majority of the time alone in the room. Denies mental health symptoms and is able to carry coherent and lucid conversation on approach. Denies withdrawal symptoms. MSSA score 3 and 5 this shift. Will continue to monitor. Denied suicidal and homicidal thoughts.    Current legal status 72 hour hold.  Refer to case manger notes for discharge planing and recommendations. Continue with current treatment plan and recommendations. Continue to monitor and reassess symptoms. Monitor response to medications. Monitor progress towards treatment goals. Encourage groups and participation.

## 2017-08-13 PROCEDURE — 12400003 ZZH R&B MH CRITICAL UMMC

## 2017-08-13 ASSESSMENT — ACTIVITIES OF DAILY LIVING (ADL)
LAUNDRY: UNABLE TO COMPLETE
ORAL_HYGIENE: INDEPENDENT
DRESS: SCRUBS (BEHAVIORAL HEALTH);INDEPENDENT
GROOMING: HANDWASHING;INDEPENDENT
LAUNDRY: WITH SUPERVISION
DRESS: SCRUBS (BEHAVIORAL HEALTH);INDEPENDENT
ORAL_HYGIENE: INDEPENDENT
GROOMING: INDEPENDENT
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
## 2017-08-14 PROCEDURE — 12400003 ZZH R&B MH CRITICAL UMMC

## 2017-08-14 PROCEDURE — 99232 SBSQ HOSP IP/OBS MODERATE 35: CPT | Performed by: PSYCHIATRY & NEUROLOGY

## 2017-08-14 ASSESSMENT — ACTIVITIES OF DAILY LIVING (ADL)
ORAL_HYGIENE: INDEPENDENT
LAUNDRY: WITH SUPERVISION
DRESS: SCRUBS (BEHAVIORAL HEALTH)
HYGIENE/GROOMING: INDEPENDENT
LAUNDRY: UNABLE TO COMPLETE
ORAL_HYGIENE: INDEPENDENT
DRESS: SCRUBS (BEHAVIORAL HEALTH)
GROOMING: INDEPENDENT
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
## 2017-08-14 NOTE — PROGRESS NOTES
08/14/17 1300   Behavioral Health   Hallucinations denies / not responding to hallucinations   Thinking intact   Orientation person: oriented;place: oriented;date: oriented;time: oriented   Memory baseline memory   Insight insight appropriate to events;insight appropriate to situation   Judgement intact   Eye Contact at examiner   Affect blunted, flat   Mood mood is calm   Physical Appearance/Attire attire appropriate to age and situation   Hygiene well groomed   Suicidality other (see comments)  (none stated or observed)   Self Injury other (see comment)  (none stated or observed)   Elopement (none stated or observed)   Activity isolative   Speech clear;coherent   Medication Sensitivity no stated side effects;no observed side effects   Psychomotor / Gait balanced;steady   Activities of Daily Living   Hygiene/Grooming independent   Oral Hygiene independent   Dress scrubs (behavioral health)   Laundry with supervision   Room Organization independent      Pt had a good shift. Isolative, in room most of shift, but pleasant/polite when interacting with staff. No significant issues to report; pt appears coherent and appropriate.

## 2017-08-14 NOTE — PLAN OF CARE
"Problem: Psychotic Symptoms  Goal: Psychotic Symptoms  Signs and symptoms of listed problems will be absent or manageable.   Outcome: No Change        Patient presents as calm, polite, and cooperative this evening. Does refuse HS Zyprexa stating, \"I don't need it\". Denies hallucinations and has no outward signs appearing to be responding to internal stimuli. Out in the milieu intermittently. Does not readily engage others, but participates when initiated. Requests to access internet to get information on his car, and to reschedule community service. Doctor notified, order written to complete tasks with staff supervision. Patient expresses appreciation. Completes tasks without incident. Denies illness. Denies suicidal ideation or thoughts to harm self and/or others. Denies physical concerns. Denies sleep concerns. States his appetite has been good.       "

## 2017-08-14 NOTE — PLAN OF CARE
Problem: General Plan of Care (Inpatient Behavioral)  Goal: Team Discussion  Team Plan:   BEHAVIORAL TEAM DISCUSSION     Participants: mara caldwell rn, riri marie  Progress: none yet; just admitted  Continued Stay Criteria/Rationale: new admit  Medical/Physical: per internal medicine  Precautions:   Behavioral Orders   Procedures     Code 1 - Restrict to Unit     Routine Programming       As clinically indicated     Status 15       Every 15 minutes.     Plan: Psychiatric evaluation and treatment.  Petition for commitment.  Assist with aftercare planning.   Determine whether patient followed through with the most recent appointments made for him at [facility] [place] (March & April of this year.)  Rationale for change in precautions or plan:  No change at this time.     Illness Management Recovery model: Personal Plan of Care     Patient completed Personal Plan of Care, identifying reasons for hospitalization and goals for discharge. Form reviewed in team meeting and signed by patient, physician, writer/CTC, RN.     Form given will be scanned into EPIC.
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
## 2017-08-15 PROCEDURE — 99232 SBSQ HOSP IP/OBS MODERATE 35: CPT | Performed by: PSYCHIATRY & NEUROLOGY

## 2017-08-15 PROCEDURE — 12400003 ZZH R&B MH CRITICAL UMMC

## 2017-08-15 ASSESSMENT — ACTIVITIES OF DAILY LIVING (ADL)
GROOMING: INDEPENDENT
DRESS: SCRUBS (BEHAVIORAL HEALTH)
ORAL_HYGIENE: INDEPENDENT
GROOMING: INDEPENDENT
DRESS: SCRUBS (BEHAVIORAL HEALTH)
ORAL_HYGIENE: INDEPENDENT

## 2017-08-15 NOTE — PLAN OF CARE
"Problem: Psychotic Symptoms  Goal: Psychotic Symptoms  Signs and symptoms of listed problems will be absent or manageable.   Outcome: No Change  Pt continues to have symptoms of psychosis. Pt remained in his room most of the shift, pt was unwilling to attend groups but he did spend some time quietly in the lounge. Pt's affect is generally blunted but he appears very tense during conversations. Pt has very intense eye contact during interactions. Pt is only minimally willing to interact, he answered most questions with only single word answers usually, \"fine.\" Pt denies hallucinations, SI/SIB and all mental health symptoms. Pt does appear to be responding at times, he is frequently smiling and talking to himself. Pt denies any physical health symptoms. Pt has refused all medications offered to him. Vs were WDL this shift.       "

## 2017-08-15 NOTE — PROGRESS NOTES
PPS supported petitions for MI commitment and Alfredo.   Expect court hold prior to expiration of 72 hour hold.

## 2017-08-15 NOTE — PROGRESS NOTES
Patient presented as tense, guarded and isolative; frequently staring intently out of his room towards staff and observing the activities of the unit.  Patient remained in room for much of shift, emerging only at meal times and to use the restroom. Patient made no complaints of physical discomfort or emotional distress. Will continue to monitor and assess.       08/14/17 2657   Behavioral Health   Hallucinations denies / not responding to hallucinations   Thinking paranoid   Orientation person: oriented;place: oriented   Memory other (see comment)  (MYRON)   Insight poor   Judgement impaired   Eye Contact staring   Affect tense   Mood anxious   Physical Appearance/Attire appears stated age;attire appropriate to age and situation   Hygiene other (see comment)  (unremarkable)   Suicidality other (see comments)  (none endorsed or observed)   Self Injury other (see comment)  (none endorsed or observed)   Elopement Hypervigilance to activities on and off the unit   Activity isolative   Speech clear;coherent   Medication Sensitivity no stated side effects;no observed side effects   Psychomotor / Gait balanced;steady   Activities of Daily Living   Hygiene/Grooming independent   Oral Hygiene independent   Dress scrubs (behavioral health)   Laundry unable to complete   Room Organization independent   Behavioral Health Interventions   Psychotic Symptoms maintain safety precautions;monitor need to revise level of observation;maintain safe secure environment;reality orientation;simple, clear language;decrease environmental stimulation;redirection of intrusive behaviors;redirection of aggressive behaviors;assist patient in developing safety plan;assist patient in following safety plan;encourage nutrition and hydration;encourage participation / independence with adls;provide emotional support;establish therapeutic relationship;assist with developing & utilizing healthy coping strategies;build upon strengths;assess patient response  to medication;assess medication adherance;monitor need for prn medication;monitor confusion, memory loss, decision making ability and reorient / intervent as needed;assess & implement appropriate substance withdrawal protocol;monitor substance withdrawal process   Social and Therapeutic Interventions (Psychotic Symptoms) encourage socialization with peers;encourage effective boundaries with peers;encourage participation in therapeutic groups and milieu activities
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
## 2017-08-16 PROCEDURE — 12400003 ZZH R&B MH CRITICAL UMMC

## 2017-08-16 ASSESSMENT — ACTIVITIES OF DAILY LIVING (ADL)
ORAL_HYGIENE: INDEPENDENT
ORAL_HYGIENE: INDEPENDENT
DRESS: SCRUBS (BEHAVIORAL HEALTH)
DRESS: SCRUBS (BEHAVIORAL HEALTH)
GROOMING: INDEPENDENT
GROOMING: INDEPENDENT
LAUNDRY: UNABLE TO COMPLETE
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
## 2017-08-17 PROCEDURE — 99232 SBSQ HOSP IP/OBS MODERATE 35: CPT | Performed by: PSYCHIATRY & NEUROLOGY

## 2017-08-17 PROCEDURE — 12400003 ZZH R&B MH CRITICAL UMMC

## 2017-08-17 PROCEDURE — 25000132 ZZH RX MED GY IP 250 OP 250 PS 637: Performed by: CLINICAL NURSE SPECIALIST

## 2017-08-17 RX ADMIN — OLANZAPINE 10 MG: 10 TABLET, ORALLY DISINTEGRATING ORAL at 20:25

## 2017-08-17 ASSESSMENT — ACTIVITIES OF DAILY LIVING (ADL)
ORAL_HYGIENE: INDEPENDENT
ORAL_HYGIENE: INDEPENDENT
DRESS: SCRUBS (BEHAVIORAL HEALTH)
GROOMING: INDEPENDENT
GROOMING: INDEPENDENT
DRESS: SCRUBS (BEHAVIORAL HEALTH);INDEPENDENT

## 2017-08-17 NOTE — PLAN OF CARE
Problem: Psychotic Symptoms  Goal: Psychotic Symptoms  Signs and symptoms of listed problems will be absent or manageable.   Outcome: No Change  Patient presents as guarded, paranoid, and socially withdrawn.  He appears anxious and hypervigilant when visible on the unit, remains mostly on the periphery of the milieu. He denies that he is experiencing any psychotic symptoms; however, he appears to be attending to internal stimuli with  (as evidenced by odd grimacing, talking to himself, and staring vacantly as if distracted by internal stimuli).  He demonstrates no appreciable insight into his mental illness, and he continues to refuse all offers of PRN and scheduled medications.  He denies any acute physical concerns.  VSS.

## 2017-08-18 PROCEDURE — 25000132 ZZH RX MED GY IP 250 OP 250 PS 637: Performed by: CLINICAL NURSE SPECIALIST

## 2017-08-18 PROCEDURE — 99232 SBSQ HOSP IP/OBS MODERATE 35: CPT | Performed by: PSYCHIATRY & NEUROLOGY

## 2017-08-18 PROCEDURE — 12400003 ZZH R&B MH CRITICAL UMMC

## 2017-08-18 RX ADMIN — OLANZAPINE 10 MG: 10 TABLET, ORALLY DISINTEGRATING ORAL at 19:54

## 2017-08-18 ASSESSMENT — ACTIVITIES OF DAILY LIVING (ADL)
GROOMING: INDEPENDENT
DRESS: SCRUBS (BEHAVIORAL HEALTH)
ORAL_HYGIENE: INDEPENDENT
DRESS: INDEPENDENT
ORAL_HYGIENE: INDEPENDENT
HYGIENE/GROOMING: INDEPENDENT
LAUNDRY: UNABLE TO COMPLETE

## 2017-08-18 NOTE — PROGRESS NOTES
P/C Tea  from [facility] [employer].  Next hearing is Wednesday 8/23/2017 at 1PM to 2PM.  Need to know who can testify in Court- schedule time and give her phone number to call OK to leave message. [phone] they will also need a new Alfredo note..

## 2017-08-18 NOTE — PROGRESS NOTES
08/17/17 2210   Significant Event   Significant Event Other (see comments)  (shift summary)     Pt was isolative and withdrawn to his room most of the evening.  While in his room, watched TV.  Pt would occasionally come out of his room and pace the halls.  Independent with his ADL's.

## 2017-08-18 NOTE — PROGRESS NOTES
"[facility], [place]   Psychiatric Progress Note        Interim History:   The patient's care was discussed with the treatment team during the daily team meeting and/or staff's chart notes were reviewed.  Staff report patient has been refusing scheduled Zyprexa, has continued to appear to be responding to internal stimuli, also stares blankly into space at times.  Pt's mother provided history that pt has recently believed that people were ejaculating into his mouth and was spitting a lot d/t this belief.  He also would watch license plates and believed that certain letters or numbers meant that the car's occupant was part of a conspiracy against him.  He slept 7 hours.  He says today his mood is \"fine\" and he says he slept well.  He says his parents visited last evening and this \"went fine.\"  He is aware of his court dates and seems rather ambivalent about it.  I ask about the meds and he says \"if it helps me get out of here then I'll start taking them.\"  We talk about alternatives to Zyprexa and the SE's but he says \"I don't care, whatever you suggest.\"  The patient had no further complaints or requests.          Medications:       OLANZapine zydis  10 mg Oral At Bedtime          Allergies:   No Known Allergies       Labs:   No results found for this or any previous visit (from the past 24 hour(s)).       Psychiatric Examination:     /86 (BP Location: Right arm)  Pulse 66  Temp 95.9  F (35.5  C) (Tympanic)  Resp 16  Ht 1.702 m (5' 7\")  Wt 81.6 kg (180 lb)  SpO2 95%  BMI 28.19 kg/m2  Weight is 180 lbs 0 oz  Body mass index is 28.19 kg/(m^2).  Orthostatic Vitals       Most Recent      Sitting Orthostatic /85 08/14 0829    Sitting Orthostatic Pulse (bpm) 80 08/14 0829    Standing Orthostatic BP --  Comment: MYRON d/t equipment failure 08/14 0829    Standing Orthostatic Pulse (bpm) 103 08/13 1300          Appearance: alert  Attitude:  guarded  Eye Contact:  poor  Mood:  " Mildly anxious  Affect:  constricted and mood congruent  Speech:  Mildly delayed  Psychomotor Behavior:  no evidence of tardive dyskinesia, dystonia, or tics, and intact station, gait and muscle tone, possibly some psychomotor slowing  Throught Process:  Somewhat vague/evasive, no spontaneous content  Associations:  no loose associations  Thought Content:  no evidence of suicidal ideation or homicidal ideation; guardedness may indicate paranoia; did not appear to respond to internal stimuli during the interview but was moaning/groaning in his room just before I entered  Insight:  poor  Judgement:  poor  Language: Appropriate  Fund of Knowledge: Average  Oriented to:  time, person, and place  Attention Span and Concentration:  impaired  Recent and Remote Memory:  intact              Precautions:     Behavioral Orders   Procedures     Code 1 - Restrict to Unit     Routine Programming     As clinically indicated     Status 15     Every 15 minutes.          DIagnoses:     1.  Psychosis, unspecified. (Possibly Schizophrenia but need collateral hx to confirm)  2.  Alcohol use disorder, moderate.          Plan:     Continue Zyprexa for psychosis; pt indicated he would likely start taking it this evening    Petition for commitment was supported.  He will have preliminary hearing on Friday and final hearing on 8/23.    Will need to see stable reduction of psychosis and improved insight before discharge can safely occur.  Dispo will depend on degree of improvement in psychosis and insight.

## 2017-08-19 PROCEDURE — 12400003 ZZH R&B MH CRITICAL UMMC

## 2017-08-19 ASSESSMENT — ACTIVITIES OF DAILY LIVING (ADL)
GROOMING: INDEPENDENT
ORAL_HYGIENE: INDEPENDENT
DRESS: SCRUBS (BEHAVIORAL HEALTH)
ORAL_HYGIENE: INDEPENDENT
DRESS: INDEPENDENT
GROOMING: INDEPENDENT

## 2017-08-19 NOTE — PROGRESS NOTES
Pt attended court today. He isolated to his room when he returned and slept most of the shift. He woke up for his dinner meal.        08/18/17 5318   Behavioral Health   Hallucinations appears responding   Thinking distractable;poor concentration   Orientation person: oriented;place: oriented;date: oriented;time: oriented   Memory baseline memory   Insight poor   Judgement impaired   Eye Contact at examiner   Affect blunted, flat   Mood mood is calm   Physical Appearance/Attire attire appropriate to age and situation   Hygiene other (see comment)  (adequate )   Suicidality other (see comments)  (none observed )   Self Injury other (see comment)  (none observed )   Elopement (no behavior observed on this shift )   Activity isolative;withdrawn   Speech clear;coherent   Psychomotor / Gait balanced;steady   Activities of Daily Living   Hygiene/Grooming independent   Oral Hygiene independent   Dress independent   Laundry unable to complete   Room Organization independent   Behavioral Health Interventions   Psychotic Symptoms maintain safety precautions;maintain safe secure environment;simple, clear language;decrease environmental stimulation   Social and Therapeutic Interventions (Psychotic Symptoms) encourage participation in therapeutic groups and milieu activities

## 2017-08-19 NOTE — PROGRESS NOTES
"[facility], [place]   Psychiatric Progress Note        Interim History:   The patient's care was discussed with the treatment team during the daily team meeting and/or staff's chart notes were reviewed.  Staff report patient took his scheduled Zyprexa last evening.  He has been isolative, guarded, preoccupied, doesn't talk to peers, but is seen talking to himself.  He says today his mood is \"all right\" and he denies SE's.  He denies having any bothersome thoughts or hallucinations.  He shows me a bump of skin overlaying a scar on the back of his scalp.  This is about 0.5 cm in diameter and appears to be a skin tag or lipoma, likely related to scar tissue overgrowth.  He readily accepts that this is a non-urgent issue that can been dealt with after his discharge.  The patient had no further complaints or requests.          Medications:       OLANZapine zydis  10 mg Oral At Bedtime          Allergies:   No Known Allergies       Labs:   No results found for this or any previous visit (from the past 24 hour(s)).       Psychiatric Examination:     /86 (BP Location: Right arm)  Pulse 66  Temp 97.4  F (36.3  C) (Tympanic)  Resp 16  Ht 1.702 m (5' 7\")  Wt 81.6 kg (180 lb)  SpO2 95%  BMI 28.19 kg/m2  Weight is 180 lbs 0 oz  Body mass index is 28.19 kg/(m^2).  Orthostatic Vitals       Most Recent      Sitting Orthostatic /85 08/14 0829    Sitting Orthostatic Pulse (bpm) 80 08/14 0829    Standing Orthostatic BP --  Comment: MYRON d/t equipment failure 08/14 0829    Standing Orthostatic Pulse (bpm) 103 08/13 1300          Appearance: alert  Attitude:  guarded  Eye Contact:  poor  Mood:  Mildly anxious  Affect:  constricted and mood congruent  Speech:  Mildly delayed  Psychomotor Behavior:  no evidence of tardive dyskinesia, dystonia, or tics, and intact station, gait and muscle tone, possibly some psychomotor slowing  Throught Process:  Somewhat vague/evasive, no spontaneous " content  Associations:  no loose associations  Thought Content:  no evidence of suicidal ideation or homicidal ideation; guardedness may indicate paranoia; did not appear to respond to internal stimuli during the interview but was moaning/groaning in his room just before I entered  Insight:  poor  Judgement:  poor  Language: Appropriate  Fund of Knowledge: Average  Oriented to:  time, person, and place  Attention Span and Concentration:  impaired  Recent and Remote Memory:  intact              Precautions:     Behavioral Orders   Procedures     Code 1 - Restrict to Unit     Routine Programming     As clinically indicated     Status 15     Every 15 minutes.          DIagnoses:     1.  Psychosis, unspecified. (Possibly Schizophrenia but need collateral hx to confirm)  2.  Alcohol use disorder, moderate.          Plan:     Continue Zyprexa for psychosis; pt began taking this on 8/17; will monitor for response and SE's and adjust accordingly.    Petition for commitment was supported.  He will have final hearing on 8/23.    Will need to see stable reduction of psychosis and improved insight before discharge can safely occur.  Dispo will depend on degree of improvement in psychosis and insight.

## 2017-08-19 NOTE — PLAN OF CARE
"Problem: Psychotic Symptoms  Goal: Psychotic Symptoms  Signs and symptoms of listed problems will be absent or manageable.   Outcome: No Change  Pt continues to have symptoms of psychosis. Symptoms were primarily negative in nature this shift. Pt did not display active symptoms of hallucinations or delusional thinking. Pt's affect remained blunted, pt was minimally interactive with staff and peers. Pt denies all mental health symptoms at this time. Pt appears somewhat anxious during interview. Pt's insight remains poor and his judgement potentially impaired.      Pt requested to use a computer to change his community service times. RN writer recommended that the pt talk to a  to do this and explained that computer use is generally restricted on inpatient units. Pt was agreeable to this but did state, \"well if I get in trouble for not showing up for my community service it'll be your fault.\"     Pt reported a bump on the back of his head which was assessed by RN writer. Pt reported that the bump has been present for weeks (he is unsure of how long) and that he missed his appointment to have a doctor look at it due to his current hospitalization. Pt denies any acute distress regarding the bump and states it is not causing him any pain. The bump is on an area of scar tissue from a distantly previous head injury. It appears to be a small raised darkened bump. Will recommend that psychiatrist assesses need for internal medicine consult the next time he sees the pt.      "

## 2017-08-20 PROCEDURE — 12400003 ZZH R&B MH CRITICAL UMMC

## 2017-08-20 ASSESSMENT — ACTIVITIES OF DAILY LIVING (ADL)
ORAL_HYGIENE: INDEPENDENT
ORAL_HYGIENE: INDEPENDENT
LAUNDRY: UNABLE TO COMPLETE
DRESS: SCRUBS (BEHAVIORAL HEALTH)
GROOMING: INDEPENDENT
DRESS: SCRUBS (BEHAVIORAL HEALTH)
HYGIENE/GROOMING: INDEPENDENT
LAUNDRY: UNABLE TO COMPLETE

## 2017-08-20 NOTE — PROGRESS NOTES
"Pt refused HS Zyprexa, stating \"I don't feel like I need it. It's not prescribed to me, I can just take it if I feel like I need it.\" Writer attempted education and explained that this medication is prescribed by Dr Simpson for daily at ; pt insisted \"no, he didn't prescribe it, I don't need it.\"   "

## 2017-08-21 PROCEDURE — 12400003 ZZH R&B MH CRITICAL UMMC

## 2017-08-21 PROCEDURE — 99232 SBSQ HOSP IP/OBS MODERATE 35: CPT | Performed by: PSYCHIATRY & NEUROLOGY

## 2017-08-21 ASSESSMENT — ACTIVITIES OF DAILY LIVING (ADL)
DRESS: SCRUBS (BEHAVIORAL HEALTH)
DRESS: SCRUBS (BEHAVIORAL HEALTH);INDEPENDENT
LAUNDRY: UNABLE TO COMPLETE
ORAL_HYGIENE: INDEPENDENT
GROOMING: INDEPENDENT
ORAL_HYGIENE: INDEPENDENT
GROOMING: INDEPENDENT

## 2017-08-21 NOTE — PLAN OF CARE
"Problem: Psychotic Symptoms  Goal: Psychotic Symptoms  Signs and symptoms of listed problems will be absent or manageable.   Outcome: No Change  Patient presents as guarded, paranoid, and socially withdrawn.  He has been isolative to his room for most of this shift.  When visible in the milieu, he appears anxious and hypervigilant.  He has not had any episodes of agitation or aggressive behavior this shift.  He demonstrates poor insight into his illness, and he denies that he is experiencing any psychotic symptoms.  He appears internally preoccupied, and he has been overheard talking to himself in his room (and making bizarre noises). His affect is blunted. He refused his HS dose of Zyprexa this evening, stating that \"I could hardly sleep the last time I took that stuff!\".  He was given access to a computer this evening, under close observation by writer, and was able to reschedule his community service obligations.  He had no further complaints or requests.    Addendum 20:29:  Met with patient's parents, Umer and Omaira, and escorted them off of the unit at the completion of routine visiting hours this evening.  Provided an update on patient's (gradual) progress toward stabilization, legal status, and discharge plan.  Discussed patient's poor medication compliance and the rationale given by patient to refuse his HS dose of Zyprexa (insomnia).  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  Omaira also reports that client c/o feeling jittery and twitchy (BLE) when he last took Zyprexa, so she suspects that he may be experiencing an adverse side effect (EPSE) from the Zyprexa.  Malvern also reports that patient was demonstrating symptoms of psychosis during their visit (laughing and mumbling to himself/ making bizarre noises).    "

## 2017-08-21 NOTE — PROGRESS NOTES
Patient was present in the milieu with minimal interaction with peers. Pt's affect is blunted/ flat. Pt stated that he was bored and requested to watch a movie. Pt ate dinner in the lounge; no other concerns to report.        08/20/17 2100   Behavioral Health   Hallucinations denies / not responding to hallucinations   Thinking poor concentration   Orientation person: oriented;place: oriented;date: oriented;time: oriented   Memory baseline memory   Insight insight appropriate to situation   Judgement impaired   Eye Contact at examiner   Affect blunted, flat   Mood mood is calm   Physical Appearance/Attire attire appropriate to age and situation   Hygiene well groomed   Suicidality other (see comments)  (none stated or observed)   Self Injury other (see comment)  (none stated or observed)   Elopement (no attempts to elope)   Activity other (see comment)  (present in milieu)   Speech clear;coherent   Medication Sensitivity no observed side effects   Psychomotor / Gait balanced;steady   Activities of Daily Living   Hygiene/Grooming independent   Oral Hygiene independent   Dress scrubs (behavioral health)   Laundry unable to complete   Room Organization independent   Behavioral Health Interventions   Psychotic Symptoms maintain safety precautions;monitor need to revise level of observation;maintain safe secure environment;reality orientation;simple, clear language;decrease environmental stimulation;redirection of intrusive behaviors;redirection of aggressive behaviors;assist patient in developing safety plan;assist patient in following safety plan;encourage nutrition and hydration;encourage participation / independence with adls;provide emotional support;establish therapeutic relationship;assist with developing & utilizing healthy coping strategies;build upon strengths;assess patient response to medication;assess medication adherance;monitor need for prn medication;monitor confusion, memory loss, decision making ability  and reorient / intervent as needed   Social and Therapeutic Interventions (Psychotic Symptoms) encourage socialization with peers;encourage effective boundaries with peers;encourage participation in therapeutic groups and milieu activities

## 2017-08-21 NOTE — PROGRESS NOTES
P/C and left message as requested for [facility] Civil Commitments, ([phone]) , Dr. Simpson will be available on pager ([phone]) to testify in court Wednesday 8/23/17, 1PM.

## 2017-08-22 PROCEDURE — 99232 SBSQ HOSP IP/OBS MODERATE 35: CPT | Performed by: PSYCHIATRY & NEUROLOGY

## 2017-08-22 PROCEDURE — 12400003 ZZH R&B MH CRITICAL UMMC

## 2017-08-22 ASSESSMENT — ACTIVITIES OF DAILY LIVING (ADL)
GROOMING: INDEPENDENT
ORAL_HYGIENE: INDEPENDENT
DRESS: SCRUBS (BEHAVIORAL HEALTH)

## 2017-08-22 NOTE — PROGRESS NOTES
"[facility], [place]   Psychiatric Progress Note        Interim History:   The patient's care was discussed with the treatment team during the daily team meeting and/or staff's chart notes were reviewed.  Staff report patient has been guarded, paranoid, refused Zyprexa Sat and Sun after taking it on Thurs and Fri.  He told the nurse he doesn't need it.  He appealed his court hold.  He says today his mood is \"all right\" and when I ask how the weekend went he says \"just boring.\"  He says he has slept okay.  When asked why he didn't take the Zyprexa the last 2 nights he says \"I couldn't fall asleep that 2nd time I took it.\"  When I ask if he'd like to change the dose timing or try a different medication, he says \"I just don't see a need for it; I'm not anxious or anything.\"  He says he talked to his parents over the weekend and this went fine.  He asks for time on the computer in order to reschedule community service hours he had planned for Wednesday.  The patient had no further complaints or requests.          Medications:       OLANZapine zydis  10 mg Oral At Bedtime          Allergies:   No Known Allergies       Labs:   No results found for this or any previous visit (from the past 24 hour(s)).       Psychiatric Examination:     /86 (BP Location: Right arm)  Pulse 66  Temp 95.1  F (35.1  C) (Tympanic)  Resp 16  Ht 1.702 m (5' 7\")  Wt 81.6 kg (180 lb)  SpO2 95%  BMI 28.19 kg/m2  Weight is 180 lbs 0 oz  Body mass index is 28.19 kg/(m^2).  Orthostatic Vitals       Most Recent      Sitting Orthostatic /85 08/14 0829    Sitting Orthostatic Pulse (bpm) 80 08/14 0829    Standing Orthostatic BP --  Comment: MYRON d/t equipment failure 08/14 0829    Standing Orthostatic Pulse (bpm) 103 08/13 1300          Appearance: alert  Attitude:  guarded  Eye Contact:  poor  Mood:  Mildly anxious  Affect:  constricted and mood congruent  Speech:  Mildly delayed  Psychomotor Behavior:  no " evidence of tardive dyskinesia, dystonia, or tics, and intact station, gait and muscle tone, possibly some psychomotor slowing  Throught Process:  Somewhat vague/evasive, no spontaneous content  Associations:  no loose associations  Thought Content:  no evidence of suicidal ideation or homicidal ideation; guardedness may indicate paranoia; did not appear to respond to internal stimuli during the interview but was moaning/groaning in his room just before I entered  Insight:  poor  Judgement:  poor  Language: Appropriate  Fund of Knowledge: Average  Oriented to:  time, person, and place  Attention Span and Concentration:  impaired  Recent and Remote Memory:  intact              Precautions:     Behavioral Orders   Procedures     Assault precautions     Code 1 - Restrict to Unit     Routine Programming     As clinically indicated     Status 15     Every 15 minutes.          DIagnoses:     1.  Psychosis, unspecified. (Possibly Schizophrenia but need collateral hx to confirm)  2.  Alcohol use disorder, moderate.          Plan:     Continue to recommend Zyprexa for psychosis; pt refused all but 2 doses since admission    Petition for commitment was supported.  He will have final hearing on 8/23.    Will need to see stable reduction of psychosis and improved insight before discharge can safely occur.  Dispo will depend on degree of improvement in psychosis and insight.

## 2017-08-22 NOTE — PROGRESS NOTES
Writer faxed completed Alfredo note to Disha at [facility] Attorney's office at [phone]. Receipt of fax verified by [employer].

## 2017-08-22 NOTE — PROGRESS NOTES
08/21/17 1946   Patient Belongings   Patient Belongings clothing;wallet   Disposition of Belongings Jeans, boxer shorts x2, pair of socks x2, white t-shirt and shoes have been placed into locked storage. Wallet and debit card x2 have been placed in Valuables Envelope [id number] and sent to Security.    Belongings Search Yes     Pt cell phone has been sent home with parents.    ADMISSION:  I am responsible for any personal items that are not sent to the safe or pharmacy. [facility] is not responsible for loss, theft or damage of any property in my possession.    Patient Signature _____________________ Date/Time _____________________    Staff Signature _______________________ Date/Time _____________________    2nd Staff person, if patient is unable/unwilling to sign  ___________________________________ Date/Time _____________________  DISCHARGE:  All personal items have been returned to me.    Patient Signature _____________________ Date/Time _____________________    Staff Signature _______________________ Date/Time _____________________

## 2017-08-23 PROCEDURE — 99231 SBSQ HOSP IP/OBS SF/LOW 25: CPT | Performed by: PHYSICIAN ASSISTANT

## 2017-08-23 PROCEDURE — 99207 ZZC CONSULT E&M CHANGED TO SUBSEQUENT LEVEL: CPT | Performed by: PHYSICIAN ASSISTANT

## 2017-08-23 PROCEDURE — 12400003 ZZH R&B MH CRITICAL UMMC

## 2017-08-23 ASSESSMENT — ACTIVITIES OF DAILY LIVING (ADL)
DRESS: SCRUBS (BEHAVIORAL HEALTH)
ORAL_HYGIENE: INDEPENDENT
LAUNDRY: UNABLE TO COMPLETE
GROOMING: INDEPENDENT
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
## 2017-08-23 NOTE — PROGRESS NOTES
"[facility], [place]   Psychiatric Progress Note        Interim History:   The patient's care was discussed with the treatment team during the daily team meeting and/or staff's chart notes were reviewed.  Staff report patient has been hypervigilant, with poor insight.  He continues to refuse Zyprexa.  He has been talking to himself and parents reported he was being non-sensical during their visit.  He says today his mood is \"all right\" and he says he slept well.  He continues to say that he doesn't need any medication.  He says he plans to return to his parents home after discharge and he thinks his parents are fine with this.  The patient had no further complaints or requests.          Medications:       OLANZapine zydis  10 mg Oral At Bedtime          Allergies:   No Known Allergies       Labs:   No results found for this or any previous visit (from the past 24 hour(s)).       Psychiatric Examination:     /86 (BP Location: Right arm)  Pulse 66  Temp 98.1  F (36.7  C) (Tympanic)  Resp 16  Ht 1.702 m (5' 7\")  Wt 81.6 kg (180 lb)  SpO2 95%  BMI 28.19 kg/m2  Weight is 180 lbs 0 oz  Body mass index is 28.19 kg/(m^2).  Orthostatic Vitals       Most Recent      Sitting Orthostatic /85 08/14 0829    Sitting Orthostatic Pulse (bpm) 80 08/14 0829    Standing Orthostatic BP --  Comment: MYRON d/t equipment failure 08/14 0829    Standing Orthostatic Pulse (bpm) 103 08/13 1300          Appearance: alert  Attitude:  guarded  Eye Contact:  poor  Mood:  Mildly anxious  Affect:  constricted and mood congruent  Speech:  Mildly delayed  Psychomotor Behavior:  no evidence of tardive dyskinesia, dystonia, or tics, and intact station, gait and muscle tone, possibly some psychomotor slowing  Throught Process:  Somewhat vague/evasive, no spontaneous content  Associations:  no loose associations  Thought Content:  no evidence of suicidal ideation or homicidal ideation; guardedness may indicate " paranoia; did not appear to respond to internal stimuli during the interview   Insight:  poor  Judgement:  poor  Language: Appropriate  Fund of Knowledge: Average  Oriented to:  time, person, and place  Attention Span and Concentration:  impaired  Recent and Remote Memory:  intact              Precautions:     Behavioral Orders   Procedures     Assault precautions     Code 1 - Restrict to Unit     Routine Programming     As clinically indicated     Status 15     Every 15 minutes.          DIagnoses:     1.  Psychosis, unspecified. (Possibly Schizophrenia but need collateral hx to confirm)  2.  Alcohol use disorder, moderate.          Plan:     Continue to recommend Zyprexa for psychosis; pt refused all but 2 doses since admission    Petition for commitment was supported.  He will have final hearing on 8/23.  Alfredo note completed and faxed.    Will need to see stable reduction of psychosis and improved insight before discharge can safely occur.  Dispo will depend on degree of improvement in psychosis and insight.

## 2017-08-23 NOTE — PLAN OF CARE
Problem: General Plan of Care (Inpatient Behavioral)  Goal: Team Discussion  Team Plan:   BEHAVIORAL TEAM DISCUSSION     Participants: Germania Jacobs RN, Eryn Dunn [facility]  Progress: none.  He is disorganized, not making sense, hypervigilant, poor insight.   He does not think he needs meds and therefore, has been refusing to take the Zyprexa (except just a couple doses).     Continued Stay Criteria/Rationale: due to the above described sypmtoms  Medical/Physical: per dr clifton and internal medicine  Precautions:   Behavioral Orders   Procedures     Assault precautions     Code 1 - Restrict to Unit     Routine Programming       As clinically indicated     Status 15       Every 15 minutes.     Plan:   Pt wants to return to his parents' home but they cannot consider it unless/until he is stable.    His final commitment hearing and Alfredo was today.   The case is under advisement.  Rationale for change in precautions or plan: no change at this moment.

## 2017-08-23 NOTE — PROGRESS NOTES
Pt present in milieu at times this evening, but withdrawn from staff and pts. Pts affect is tense and pt is frequently scanning the lounge when he is in it. Pt denies AH, VH, SI, or SIB. No behavioral concerns to report this evening.      08/22/17 2040   Behavioral Health   Hallucinations denies / not responding to hallucinations   Thinking intact   Orientation person: oriented;place: oriented;date: oriented;time: oriented   Memory baseline memory   Insight denial of illness   Judgement impaired   Eye Contact at examiner   Affect tense   Mood mood is calm   Physical Appearance/Attire untidy   Hygiene neglected grooming - unclean body, hair, teeth   Suicidality other (see comments)  (none noted)   Self Injury other (see comment)  (none noted)   Activity withdrawn   Speech coherent;clear   Medication Sensitivity no observed side effects;no stated side effects   Psychomotor / Gait balanced;steady   Activities of Daily Living   Hygiene/Grooming independent   Oral Hygiene independent   Dress scrubs (behavioral health)   Room Organization independent   Behavioral Health Interventions   Psychotic Symptoms maintain safety precautions;monitor need to revise level of observation;maintain safe secure environment;provide emotional support;establish therapeutic relationship;assist with developing & utilizing healthy coping strategies;build upon strengths;monitor need for prn medication   Social and Therapeutic Interventions (Psychotic Symptoms) encourage socialization with peers;encourage effective boundaries with peers;encourage participation in therapeutic groups and milieu activities
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
## 2017-08-24 PROCEDURE — 25000132 ZZH RX MED GY IP 250 OP 250 PS 637: Performed by: STUDENT IN AN ORGANIZED HEALTH CARE EDUCATION/TRAINING PROGRAM

## 2017-08-24 PROCEDURE — H2032 ACTIVITY THERAPY, PER 15 MIN: HCPCS

## 2017-08-24 PROCEDURE — 99232 SBSQ HOSP IP/OBS MODERATE 35: CPT | Mod: GC | Performed by: PSYCHIATRY & NEUROLOGY

## 2017-08-24 PROCEDURE — 12400003 ZZH R&B MH CRITICAL UMMC

## 2017-08-24 RX ORDER — RISPERIDONE 1 MG/1
1 TABLET ORAL 2 TIMES DAILY
Status: DISCONTINUED | OUTPATIENT
Start: 2017-08-24 | End: 2017-08-25

## 2017-08-24 RX ADMIN — RISPERIDONE 1 MG: 1 TABLET ORAL at 20:13

## 2017-08-24 ASSESSMENT — ACTIVITIES OF DAILY LIVING (ADL)
DRESS: SCRUBS (BEHAVIORAL HEALTH)
GROOMING: INDEPENDENT
ORAL_HYGIENE: INDEPENDENT
GROOMING: INDEPENDENT
DRESS: SCRUBS (BEHAVIORAL HEALTH)
LAUNDRY: WITH SUPERVISION
ORAL_HYGIENE: INDEPENDENT

## 2017-08-24 NOTE — PROGRESS NOTES
08/24/17 1200   Behavioral Health   Hallucinations denies / not responding to hallucinations   Thinking distractable   Orientation person: oriented;place: oriented;date: oriented;time: oriented   Memory baseline memory   Insight poor   Judgement impaired   Eye Contact at examiner;at floor   Affect blunted, flat   Mood mood is calm   Physical Appearance/Attire attire appropriate to age and situation   Hygiene well groomed   Suicidality other (see comments)  (none stated or observed)   Self Injury other (see comment)  (none stated or observed)   Elopement (none stated or observed)   Activity withdrawn;isolative   Speech coherent;clear   Medication Sensitivity no observed side effects;no stated side effects   Psychomotor / Gait balanced;steady   Activities of Daily Living   Hygiene/Grooming independent   Oral Hygiene independent   Dress scrubs (behavioral health)   Laundry with supervision   Room Organization independent     Pt was quiet, withdrawn, and isolative throughout shift. Came out for meals but spent the rest of shift in his room watching TV or sleeping. No behavioral issues or significant events to report.

## 2017-08-24 NOTE — PROGRESS NOTES
"[facility], [place], [place]   Psychiatric Progress Note        Interim History:   The patient's care was discussed with the treatment team during the daily team meeting and/or staff's chart notes were reviewed.  Staff report patient declined invitations to attend groups.  Pt sat at lounge table, flipped through magazines.  No socializations or OT groups.  Pt slept during much of shift.  Slept well (6.75h) overnight.    Today the pt was seen in his room.  Pt inquires whether there was any news yet regarding court proceedings.  Meanwhile, pt realizes he will likely need to be on some sort of medication.  Pt dislikes Zyprexa because he feels it disrupts his sleep.  Pt is amenable to switching to Risperidone; pt was given patient drug info from [facility].  Pt reports feeling bored, he has read the books and magazines already.  Team suggested he might soon be a candidate for transfer to a less-restrictive rubio (eg. [facility]) which has more activities / reading material / video games.  The patient had no further complaints or requests.          Medications:       risperiDONE  1 mg Oral BID          Allergies:   No Known Allergies       Labs:   No results found for this or any previous visit (from the past 24 hour(s)).       Psychiatric Examination:     /86 (BP Location: Right arm)  Pulse 66  Temp 97.3  F (36.3  C) (Tympanic)  Resp 14  Ht 1.702 m (5' 7\")  Wt 82.6 kg (182 lb)  SpO2 95%  BMI 28.51 kg/m2  Weight is 182 lbs 0 oz  Body mass index is 28.51 kg/(m^2).  Orthostatic Vitals       Most Recent      Sitting Orthostatic /85 08/14 0829    Sitting Orthostatic Pulse (bpm) 80 08/14 0829    Standing Orthostatic BP --  Comment: MYRON d/t equipment failure 08/14 0829    Standing Orthostatic Pulse (bpm) 103 08/13 1300          Appearance: alert  Attitude:  Guarded, somewhat cooperative  Eye Contact:  Fair  Mood:  Mildly anxious, neutral  Affect:  constricted and mood congruent  Speech:  Mildly " delayed, clear, without dysarthria  Psychomotor Behavior:  no evidence of tardive dyskinesia, dystonia, or tics, and intact station, gait and muscle tone, possibly some psychomotor slowing  Throught Process:  Somewhat vague/evasive, no spontaneous content  Associations:  no loose associations  Thought Content:  no evidence of suicidal ideation or homicidal ideation; guardedness may indicate paranoia; did not appear to respond to internal stimuli during the interview   Insight:  fair  Judgement:  fair  Language: Appropriate  Fund of Knowledge: Average  Oriented to:  time, person, and place  Attention Span and Concentration:  impaired  Recent and Remote Memory:  intact            Precautions:     Behavioral Orders   Procedures     Assault precautions     Code 1 - Restrict to Unit     Routine Programming     As clinically indicated     Status 15     Every 15 minutes.          DIagnoses:     1.  Psychosis, unspecified. (Possibly Schizophrenia but need collateral hx to confirm)  2.  Alcohol use disorder, moderate.          Plan:     Discontinued Zyprexa, pt felt it disrupted his sleep.  Started Risperidone 1mg BID, will titrate as needed, and will recommend Consta injection upon discharge.    Petition for commitment was supported.  Final hearing occurred 8/23; awaiting court decision.  Alfredo note completed and faxed.    Will need to see stable reduction of psychosis and improved insight before discharge can safely occur.  Dispo will depend on degree of improvement in psychosis and insight.        Attestation:    This documentation accurately reflects the services I personally performed and treatment decisions made by me in consultation with the attending physician.    Hugo Estrella MD, PGY-2 Psychiatry resident  Pager [phone]    This patient has been seen and evaluated by me, Yasmany Simpson MD on the date of this note. I have discussed this patient with the the resident and I agree with the findings and plan in this  note. I have reviewed today's vital signs, medications, labs and imaging.

## 2017-08-24 NOTE — PROGRESS NOTES
08/23/17 2200   Behavioral Health   Hallucinations denies / not responding to hallucinations   Thinking distractable;delusional   Orientation person: oriented;place: oriented;date: oriented;time: oriented   Memory baseline memory   Insight poor   Judgement impaired   Eye Contact at examiner   Affect blunted, flat   Mood mood is calm   Activity isolative;withdrawn   Behavioral Health Interventions   Psychotic Symptoms maintain safety precautions;maintain safe secure environment;reality orientation;simple, clear language;decrease environmental stimulation;assist patient in following safety plan;assist patient in developing safety plan;encourage nutrition and hydration;encourage participation / independence with adls;provide emotional support;assist with developing & utilizing healthy coping strategies;build upon strengths   Social and Therapeutic Interventions (Psychotic Symptoms) encourage socialization with peers;encourage effective boundaries with peers;encourage participation in therapeutic groups and milieu activities   Pt was present in milieu briefly towards beginning of evening.  Pt sat at table and flipped through magazines.  After dinner pt retreated to room where he remained sleeping for the remainder of the shift.  Pt did not socialize with peers or participate in group activities.  Writer was unable to check in with pt due to pt being asleep for much of shift.

## 2017-08-25 PROCEDURE — 25000132 ZZH RX MED GY IP 250 OP 250 PS 637: Performed by: STUDENT IN AN ORGANIZED HEALTH CARE EDUCATION/TRAINING PROGRAM

## 2017-08-25 PROCEDURE — 99232 SBSQ HOSP IP/OBS MODERATE 35: CPT | Performed by: PSYCHIATRY & NEUROLOGY

## 2017-08-25 PROCEDURE — 12400007 ZZH R&B MH INTERMEDIATE UMMC

## 2017-08-25 RX ORDER — RISPERIDONE 2 MG/1
2 TABLET ORAL 2 TIMES DAILY
Status: DISCONTINUED | OUTPATIENT
Start: 2017-08-25 | End: 2017-09-01

## 2017-08-25 RX ADMIN — RISPERIDONE 2 MG: 2 TABLET ORAL at 20:46

## 2017-08-25 RX ADMIN — RISPERIDONE 1 MG: 1 TABLET ORAL at 10:26

## 2017-08-25 ASSESSMENT — ACTIVITIES OF DAILY LIVING (ADL)
DRESS: SCRUBS (BEHAVIORAL HEALTH)
GROOMING: INDEPENDENT
LAUNDRY: WITH SUPERVISION
DRESS: STREET CLOTHES
ORAL_HYGIENE: INDEPENDENT
GROOMING: INDEPENDENT
ORAL_HYGIENE: INDEPENDENT
LAUNDRY: UNABLE TO COMPLETE
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
## 2017-08-25 NOTE — PLAN OF CARE
"Problem: Psychotic Symptoms  Goal: Psychotic Symptoms  Signs and symptoms of listed problems will be absent or manageable.   Outcome: No Change  Pt spent most of the evening in his room. Pt was seen smiling at laughing by himself when entering and leaving his room, but he denies any type of psychotic symptoms.   He denies SI/SIB and says that he's simply \"bored.\"  He doesn't believe that he needs to be in the hospital and feels like he's just waiting for permission to leave.  Pt would like a transfer to a less acute unit.  Pt hesitated to taking his medications but was compliant.        "

## 2017-08-25 NOTE — PROGRESS NOTES
"   08/25/17 1501   Significant Event   Significant Event Other (see comments)  (shift summary)   Pt had an uneventful shift. He was isolative to his room, sleeping on and off, for the majority of the shift. He did however, come out into the lounge when the other pts were not there. He is pleasant, calm, and guarded on approach. States he is \"bored.\"  "

## 2017-08-25 NOTE — PROGRESS NOTES
Pt's court case is still under advisement.  I called and spoke with pt's mother, Omaira, today ([phone])   Update given including that pt is likely transferring to Station 22 when a bed becomes available.    I spoke with patient today and he says no matter what the outcome is regarding court, he plans to return home to live with his parents in [place].    Patient says he was last seen for psychiatry at [facility] in [facility].    He has been a no-show in the past, so we will need to check to see whether they will schedule him again.    [facility] Relapse Prevention Plan needs to be done at time of discharge.    Diagnostic Assessment was completed and faxed by coverage , EDWARD Tyler on 8-18-17.

## 2017-08-25 NOTE — PLAN OF CARE
Problem: Psychotic Symptoms  Goal: Psychotic Symptoms  Signs and symptoms of listed problems will be absent or manageable.   Outcome: No Change  Pt continues to present as internally preoccupied, appearing aloof and distant but does not appear to be overtly responding to internal stimuli this shift. Pt is isolative and withdrawn, appearing only on the periphery of the therapeutic milieu, watching others from his doorway. Affect is blunted and mood is calm on approach. Pt denies all symptoms of MI despite observed behaviors, and minimizes the events leading to his admission. Pt denies physical health concerns and SEs to medication at this time. Pt is to transfer to station 22, and report has been given to AMARI Bland. Will transfer when bed is available.

## 2017-08-26 PROCEDURE — 25000132 ZZH RX MED GY IP 250 OP 250 PS 637: Performed by: STUDENT IN AN ORGANIZED HEALTH CARE EDUCATION/TRAINING PROGRAM

## 2017-08-26 PROCEDURE — 12400007 ZZH R&B MH INTERMEDIATE UMMC

## 2017-08-26 PROCEDURE — 25000132 ZZH RX MED GY IP 250 OP 250 PS 637: Performed by: PSYCHIATRY & NEUROLOGY

## 2017-08-26 RX ADMIN — HYDROXYZINE HYDROCHLORIDE 50 MG: 25 TABLET ORAL at 10:25

## 2017-08-26 RX ADMIN — RISPERIDONE 2 MG: 2 TABLET ORAL at 08:46

## 2017-08-26 RX ADMIN — RISPERIDONE 2 MG: 2 TABLET ORAL at 20:20

## 2017-08-26 ASSESSMENT — ACTIVITIES OF DAILY LIVING (ADL)
LAUNDRY: WITH SUPERVISION
ORAL_HYGIENE: INDEPENDENT
DRESS: STREET CLOTHES
ORAL_HYGIENE: INDEPENDENT
HYGIENE/GROOMING: INDEPENDENT
LAUNDRY: WITH SUPERVISION
HYGIENE/GROOMING: INDEPENDENT
DRESS: INDEPENDENT;STREET CLOTHES

## 2017-08-26 NOTE — PROGRESS NOTES
Pt was a new admit (transfer) on the unit this shift. Pt was observed watching TV and socializing with peers. Pt complied to a staff check-in. During the check-in the pt stated he was in a good mood and denied any mental health symptoms. Pt answered in short, succinct phrases and appeared to want to end the interaction as quickly as possible.        08/25/17 2053   Behavioral Health   Hallucinations denies / not responding to hallucinations   Thinking poor concentration   Orientation person: oriented;place: oriented;date: oriented;time: oriented   Memory baseline memory   Insight poor   Judgement intact   Eye Contact at examiner   Affect blunted, flat   Mood mood is calm   Physical Appearance/Attire attire appropriate to age and situation   Hygiene well groomed   Suicidality (Pt did not report)   Self Injury (Pt did not report)   Elopement (No perceived risk)   Activity (Active in milieu)   Speech clear;coherent   Medication Sensitivity no stated side effects;no observed side effects   Psychomotor / Gait balanced;steady   Psycho Education   Type of Intervention 1:1 intervention   Response participates with encouragement   Hours 0.5   Treatment Detail (Check-in)   Activities of Daily Living   Hygiene/Grooming independent   Oral Hygiene independent   Dress street clothes   Laundry with supervision   Room Organization independent   Activity   Activity Level of Assistance independent

## 2017-08-26 NOTE — PROGRESS NOTES
In periphery of milieu and group. Freq distracted, responding to internal stim. Reports being anxious. Guarded with further info. Ate meals. Adequate grooming, no shower.       08/26/17 1300   Behavioral Health   Hallucinations appears responding;auditory;other (see comment)  (Talking to self freq in am.)   Thinking distractable;delusional;poor concentration   Orientation person: oriented;place: oriented   Insight poor   Judgement impaired   Eye Contact into space   Affect blunted, flat;tense   Mood anxious   Physical Appearance/Attire attire appropriate to age and situation   Hygiene other (see comment)  (adequate)   Activity restless;withdrawn   Speech clear   Psychomotor / Gait balanced;steady   Psycho Education   Type of Intervention structured groups   Response observes from a distance   Activities of Daily Living   Hygiene/Grooming independent   Oral Hygiene independent   Dress street clothes   Laundry with supervision   Room Organization independent

## 2017-08-27 PROCEDURE — 12400007 ZZH R&B MH INTERMEDIATE UMMC

## 2017-08-27 PROCEDURE — 25000132 ZZH RX MED GY IP 250 OP 250 PS 637: Performed by: STUDENT IN AN ORGANIZED HEALTH CARE EDUCATION/TRAINING PROGRAM

## 2017-08-27 RX ADMIN — RISPERIDONE 2 MG: 2 TABLET ORAL at 20:38

## 2017-08-27 RX ADMIN — RISPERIDONE 2 MG: 2 TABLET ORAL at 08:57

## 2017-08-27 ASSESSMENT — ACTIVITIES OF DAILY LIVING (ADL)
GROOMING: INDEPENDENT
DRESS: INDEPENDENT
DRESS: INDEPENDENT
LAUNDRY: WITH SUPERVISION
ORAL_HYGIENE: INDEPENDENT
HYGIENE/GROOMING: INDEPENDENT
ORAL_HYGIENE: INDEPENDENT

## 2017-08-27 NOTE — PROGRESS NOTES
Visible in milieu, isolative to self, not social. Freq distracted, appears responding to internal stim.  Ate meals.     08/26/17 1900   Behavioral Health   Hallucinations appears responding;auditory   Thinking distractable;poor concentration;delusional   Orientation person: oriented;place: oriented   Insight poor   Judgement impaired   Affect blunted, flat;incongruent   Mood mood is calm   Physical Appearance/Attire attire appropriate to age and situation   Hygiene other (see comment)  (adequate)   Activity isolative;withdrawn   Speech clear   Psychomotor / Gait balanced;steady   Activities of Daily Living   Hygiene/Grooming independent   Oral Hygiene independent   Dress independent;street clothes   Laundry with supervision   Room Organization independent

## 2017-08-27 NOTE — PROGRESS NOTES
Isolative to self. Not social. No groups. Appears responding to internal stim, easily distracted. In periphery of milieu.     08/27/17 1400   Behavioral Health   Hallucinations appears responding;auditory   Thinking distractable;poor concentration   Orientation person: oriented;place: oriented   Insight poor   Judgement impaired   Affect blunted, flat   Mood mood is calm   Physical Appearance/Attire attire appropriate to age and situation   Hygiene other (see comment)  (adequate)   Activity isolative;withdrawn   Speech clear   Psychomotor / Gait balanced;steady   Psycho Education   Type of Intervention structured groups   Response refuses   Activities of Daily Living   Hygiene/Grooming independent   Oral Hygiene independent   Dress independent   Laundry with supervision   Room Organization independent

## 2017-08-28 PROCEDURE — 99233 SBSQ HOSP IP/OBS HIGH 50: CPT | Mod: GC | Performed by: PSYCHIATRY & NEUROLOGY

## 2017-08-28 PROCEDURE — 12400007 ZZH R&B MH INTERMEDIATE UMMC

## 2017-08-28 PROCEDURE — 25000132 ZZH RX MED GY IP 250 OP 250 PS 637: Performed by: STUDENT IN AN ORGANIZED HEALTH CARE EDUCATION/TRAINING PROGRAM

## 2017-08-28 RX ADMIN — RISPERIDONE 2 MG: 2 TABLET ORAL at 09:02

## 2017-08-28 RX ADMIN — RISPERIDONE 2 MG: 2 TABLET ORAL at 21:00

## 2017-08-28 ASSESSMENT — ACTIVITIES OF DAILY LIVING (ADL)
DRESS: STREET CLOTHES
LAUNDRY: WITH SUPERVISION
ORAL_HYGIENE: INDEPENDENT
DRESS: STREET CLOTHES;SCRUBS (BEHAVIORAL HEALTH)
ORAL_HYGIENE: INDEPENDENT
GROOMING: INDEPENDENT
GROOMING: INDEPENDENT
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
## 2017-08-28 NOTE — PROGRESS NOTES
"   08/28/17 1121   Behavioral Health   Hallucinations appears responding;denies / not responding to hallucinations   Thinking distractable;poor concentration   Orientation person: oriented;place: oriented   Insight denial of illness;poor   Judgement impaired   Eye Contact at examiner   Affect blunted, flat   Mood mood is calm   Physical Appearance/Attire disheveled   Hygiene (fair )   Suicidality (denies )   Self Injury (none)   Activity isolative;withdrawn;refusal   Speech clear;coherent   Psychomotor / Gait balanced;steady   Activities of Daily Living   Hygiene/Grooming independent   Oral Hygiene independent   Dress street clothes;scrubs (behavioral health)   Room Organization independent   Pt. Showed low profile on unit, generally isolative to room, declined scheduled groups. Pt affect flat, socially withdrawn. Pt reports feeling \"fine,\" \"I'm just bored.\" Pt denies current MH concerns. Pt denies halls, denies any active SI or safety concerns at this time.   "

## 2017-08-29 PROCEDURE — 99233 SBSQ HOSP IP/OBS HIGH 50: CPT | Mod: GC | Performed by: PSYCHIATRY & NEUROLOGY

## 2017-08-29 PROCEDURE — 25000132 ZZH RX MED GY IP 250 OP 250 PS 637: Performed by: STUDENT IN AN ORGANIZED HEALTH CARE EDUCATION/TRAINING PROGRAM

## 2017-08-29 PROCEDURE — 12400007 ZZH R&B MH INTERMEDIATE UMMC

## 2017-08-29 RX ADMIN — RISPERIDONE 2 MG: 2 TABLET ORAL at 19:48

## 2017-08-29 RX ADMIN — RISPERIDONE 2 MG: 2 TABLET ORAL at 09:03

## 2017-08-29 ASSESSMENT — ACTIVITIES OF DAILY LIVING (ADL)
DRESS: INDEPENDENT
ORAL_HYGIENE: INDEPENDENT
GROOMING: INDEPENDENT
DRESS: STREET CLOTHES
GROOMING: INDEPENDENT
ORAL_HYGIENE: INDEPENDENT
LAUNDRY: WITH SUPERVISION

## 2017-08-29 NOTE — PLAN OF CARE
"Problem: Psychotic Symptoms  Goal: Psychotic Symptoms  Signs and symptoms of listed problems will be absent or manageable.   Outcome: Improving  48 hour nursing assessment:  Pt evaluation continues. Assessed mood, anxiety, thoughts, and behavior. Is progressing towards goals. Encourage participation in groups and developing healthy coping skills. Pt denies auditory or visual  hallucinations. Refer to daily team meeting notes for individualized plan of care. Will continue to assess.     Patient presents with flat affect, denies any and all mental health symptoms including SI/SIB thoughts or intent. Patient states he is feeling \"impatient\" and wants to know why he is still on a court hold since he is taking his meds. Patient is medication compliant and interactions with patient are pleasant and appropriate. Patient had a heart rate this morning of 154, writer reassessed at 1300 and heart rate was 110. Patient denies feeling any chest pain or difficulty breathing. Patient denies feeling anxious. Visible in the milieu but mostly keeps to self. Patient eats well at meal times. No other concerns at this time, will continue to monitor and assess.       "
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
## 2017-08-29 NOTE — PROGRESS NOTES
Tiburcio has not attended OT since transfer to station 22.  Has declined when invited.      Will continue to encourage participation and completion of  self-assessment as able. OT staff will explain the purpose of being involved with treatment plan and provide options to meet current needs and goals.

## 2017-08-29 NOTE — PROGRESS NOTES
Pt was isolative throughout the shift. When observed in the milieu he was notably anti-social. Pt was observed sleeping in his room, watching TV, and doing a puzzle throughout the shift. The pt did not attend the community meeting but did comply to a staff check-in. During the check-in the pt answered in short succinct phrases and did not appear to want the interaction to last longer than it had to. The pt denied any mental health symptoms including SI or SIB.        08/28/17 2055   Behavioral Health   Hallucinations denies / not responding to hallucinations   Thinking distractable;poor concentration   Orientation person: oriented;place: oriented;date: oriented;time: oriented   Memory baseline memory   Insight poor   Judgement intact   Eye Contact at floor;at examiner   Affect blunted, flat   Mood mood is calm   Physical Appearance/Attire disheveled   Hygiene neglected grooming - unclean body, hair, teeth   Suicidality (denies urges)   Self Injury (denies urges)   Elopement (No apparent risk)   Activity isolative   Speech coherent   Medication Sensitivity no stated side effects;no observed side effects   Psychomotor / Gait slow;balanced;steady   Psycho Education   Type of Intervention 1:1 intervention   Response participates with encouragement   Hours 0.5   Treatment Detail (Check-in)   Activities of Daily Living   Hygiene/Grooming independent   Oral Hygiene independent   Dress street clothes   Laundry with supervision   Room Organization independent   Activity   Activity Level of Assistance independent

## 2017-08-30 PROCEDURE — 25000132 ZZH RX MED GY IP 250 OP 250 PS 637: Performed by: STUDENT IN AN ORGANIZED HEALTH CARE EDUCATION/TRAINING PROGRAM

## 2017-08-30 PROCEDURE — 12400007 ZZH R&B MH INTERMEDIATE UMMC

## 2017-08-30 PROCEDURE — 99231 SBSQ HOSP IP/OBS SF/LOW 25: CPT | Mod: GC | Performed by: PSYCHIATRY & NEUROLOGY

## 2017-08-30 RX ADMIN — RISPERIDONE 2 MG: 2 TABLET ORAL at 21:42

## 2017-08-30 RX ADMIN — RISPERIDONE 2 MG: 2 TABLET ORAL at 09:02

## 2017-08-30 ASSESSMENT — ACTIVITIES OF DAILY LIVING (ADL)
ORAL_HYGIENE: INDEPENDENT
GROOMING: INDEPENDENT
LAUNDRY: WITH SUPERVISION
DRESS: INDEPENDENT
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
## 2017-08-30 NOTE — PROGRESS NOTES
Patient had a calm shift.  Keeping to his self and joining group when promted.  Denies all mental health symptoms including si / sib
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
## 2017-08-30 NOTE — PROGRESS NOTES
Writer contacted [facility] Civil [employer], [phone], and left voicemail message requesting return call to get updat on pt's commitment outcome from court date of 8/23/17. Waiting for return call.     Update: Spoke with [facility] district courts who report that the commitment is still under review. Report there should be a decision by the end of this week, 9/1/17.

## 2017-08-31 PROCEDURE — 12400007 ZZH R&B MH INTERMEDIATE UMMC

## 2017-08-31 PROCEDURE — 25000132 ZZH RX MED GY IP 250 OP 250 PS 637: Performed by: PSYCHIATRY & NEUROLOGY

## 2017-08-31 PROCEDURE — 25000132 ZZH RX MED GY IP 250 OP 250 PS 637: Performed by: STUDENT IN AN ORGANIZED HEALTH CARE EDUCATION/TRAINING PROGRAM

## 2017-08-31 RX ADMIN — RISPERIDONE 2 MG: 2 TABLET ORAL at 20:53

## 2017-08-31 RX ADMIN — HYDROXYZINE HYDROCHLORIDE 50 MG: 25 TABLET ORAL at 16:58

## 2017-08-31 RX ADMIN — RISPERIDONE 2 MG: 2 TABLET ORAL at 09:14

## 2017-08-31 ASSESSMENT — ACTIVITIES OF DAILY LIVING (ADL)
HYGIENE/GROOMING: INDEPENDENT
DRESS: PROMPTS
LAUNDRY: WITH SUPERVISION
ORAL_HYGIENE: PROMPTS
GROOMING: PROMPTS

## 2017-08-31 NOTE — PROGRESS NOTES
The pt was present in the milieu but was isolative and kept mostly to himself. The pt was observed either in his room or watching TV throughout much of the shift. The pt did not attend the community meeting but did comply to a staff check-in. During the check-in the pt stated he was in a good mood. The pt appeared notably blunt and flat and answered questions with short, succinct responses. The pt denied all mental health symptoms including SI or SIB.        08/30/17 2028   Behavioral Health   Hallucinations denies / not responding to hallucinations   Thinking poor concentration;intact   Orientation person: oriented;place: oriented;date: oriented;time: oriented   Memory baseline memory   Insight insight appropriate to situation;insight appropriate to events   Judgement intact   Eye Contact at examiner   Affect blunted, flat;incongruent   Mood mood is calm   Physical Appearance/Attire attire appropriate to age and situation   Hygiene neglected grooming - unclean body, hair, teeth   Suicidality (denies urges)   Self Injury (denies urges)   Elopement (No apparent risk)   Activity (Pt present in milieu)   Speech clear;coherent   Medication Sensitivity no stated side effects;no observed side effects   Psychomotor / Gait balanced;steady   Psycho Education   Type of Intervention 1:1 intervention   Response participates with encouragement   Hours 0.5   Treatment Detail (Check-in)   Activities of Daily Living   Hygiene/Grooming independent   Oral Hygiene independent   Dress independent   Laundry with supervision   Room Organization independent   Activity   Activity Level of Assistance independent
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
## 2017-08-31 NOTE — PROGRESS NOTES
Isolative to room most of shift. Blunted, distracted. Appears responding to internal stim. Ate meals. No shower.       08/31/17 1300   Behavioral Health   Hallucinations appears responding   Thinking distractable;poor concentration   Orientation person: oriented;place: oriented   Memory baseline memory   Insight poor   Judgement impaired   Eye Contact at examiner   Affect blunted, flat;incongruent   Mood depressed   Physical Appearance/Attire attire appropriate to age and situation;untidy   Hygiene neglected grooming - unclean body, hair, teeth   Activity withdrawn;isolative   Speech clear;coherent   Psychomotor / Gait balanced;steady   Psycho Education   Type of Intervention structured groups   Response refuses   Activities of Daily Living   Hygiene/Grooming independent  (Declined)

## 2017-09-01 PROCEDURE — 25000132 ZZH RX MED GY IP 250 OP 250 PS 637: Performed by: STUDENT IN AN ORGANIZED HEALTH CARE EDUCATION/TRAINING PROGRAM

## 2017-09-01 PROCEDURE — 99233 SBSQ HOSP IP/OBS HIGH 50: CPT | Mod: GC | Performed by: PSYCHIATRY & NEUROLOGY

## 2017-09-01 PROCEDURE — 25000132 ZZH RX MED GY IP 250 OP 250 PS 637

## 2017-09-01 PROCEDURE — 12400007 ZZH R&B MH INTERMEDIATE UMMC

## 2017-09-01 RX ORDER — RISPERIDONE 2 MG/1
2 TABLET ORAL AT BEDTIME
Status: COMPLETED | OUTPATIENT
Start: 2017-09-01 | End: 2017-09-01

## 2017-09-01 RX ORDER — RISPERIDONE 3 MG/1
3 TABLET ORAL AT BEDTIME
Status: DISCONTINUED | OUTPATIENT
Start: 2017-09-01 | End: 2017-09-01

## 2017-09-01 RX ORDER — RISPERIDONE 3 MG/1
3 TABLET ORAL AT BEDTIME
Status: DISCONTINUED | OUTPATIENT
Start: 2017-09-02 | End: 2017-09-14 | Stop reason: HOSPADM

## 2017-09-01 RX ORDER — PROPRANOLOL HYDROCHLORIDE 10 MG/1
10 TABLET ORAL 3 TIMES DAILY PRN
Status: DISCONTINUED | OUTPATIENT
Start: 2017-09-01 | End: 2017-09-05

## 2017-09-01 RX ADMIN — RISPERIDONE 2 MG: 2 TABLET ORAL at 22:20

## 2017-09-01 RX ADMIN — RISPERIDONE 2 MG: 2 TABLET ORAL at 08:45

## 2017-09-01 ASSESSMENT — ACTIVITIES OF DAILY LIVING (ADL)
GROOMING: PROMPTS
DRESS: INDEPENDENT
DRESS: INDEPENDENT
ORAL_HYGIENE: INDEPENDENT
GROOMING: PROMPTS
ORAL_HYGIENE: INDEPENDENT
LAUNDRY: WITH SUPERVISION

## 2017-09-01 NOTE — PROGRESS NOTES
Pt was isolative and withdrawn to his room sleeping the majority of the shift. Pt appeared in the milieu at meal times. Pt denies SI/SIB, hallucinations, and medication side affects.      09/01/17 1400   Behavioral Health   Hallucinations appears responding   Thinking distractable;poor concentration   Orientation person: oriented;place: oriented;date: oriented   Memory baseline memory   Insight poor   Judgement impaired   Eye Contact at examiner   Affect blunted, flat   Mood mood is calm   Physical Appearance/Attire attire appropriate to age and situation   Hygiene neglected grooming - unclean body, hair, teeth   Suicidality other (see comments)  (denies)   Self Injury other (see comment)  (denies)   Elopement (no current concerns )   Activity isolative;withdrawn   Speech coherent   Medication Sensitivity no stated side effects;no observed side effects   Psychomotor / Gait balanced;steady   Psycho Education   Type of Intervention 1:1 intervention   Response participates with encouragement   Activities of Daily Living   Hygiene/Grooming prompts   Oral Hygiene independent   Dress independent   Laundry with supervision   Room Organization prompts

## 2017-09-01 NOTE — PLAN OF CARE
Problem: General Plan of Care (Inpatient Behavioral)  Goal: Individualization/Patient Specific Goal (IP Behavioral)  The patient and/or their representative will achieve their patient-specific goals related to the plan of care.    The patient-specific goals include:   Outcome: Therapy, progress toward functional goals as expected  Illness Management Recovery model:  Feedback.     Patient identified the following people they would like to receive feedback from if/when they see early warning signs:     Friend(s)      Family(s):  Partner/Spouse:  Support Group Member(s):     Co-Worker(s):no discussion of imr subjects
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
## 2017-09-01 NOTE — PROGRESS NOTES
Re: Civil Committment      [facility] supported commitment and Alfredo. Patient is committed to [facility] and the Commissioner until 2/2018      Yanique Sanchez, LPCC, [facility]
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
## 2017-09-02 PROCEDURE — 12400007 ZZH R&B MH INTERMEDIATE UMMC

## 2017-09-02 PROCEDURE — 25000132 ZZH RX MED GY IP 250 OP 250 PS 637

## 2017-09-02 RX ADMIN — PROPRANOLOL HYDROCHLORIDE 10 MG: 10 TABLET ORAL at 00:03

## 2017-09-02 RX ADMIN — RISPERIDONE 3 MG: 3 TABLET ORAL at 21:28

## 2017-09-02 NOTE — PROGRESS NOTES
Pt slept nearly entire shift. Made short and infrequent appearances early in shift but was not social with staff or peers while out. Pt did shower this shift, but otherwise slept. Pt unavailable for check in this evening. Unable to ascertain status of sx. No safety or elopement concerns at this time.         09/01/17 2200   Behavioral Health   Hallucinations appears responding   Thinking distractable   Affect blunted, flat   Mood mood is calm   Physical Appearance/Attire attire appropriate to age and situation   Hygiene neglected grooming - unclean body, hair, teeth   Elopement (no concerns)   Activity isolative;withdrawn;other (see comment)  (sleeping)   Psychomotor / Gait balanced;steady   Psycho Education   Type of Intervention 1:1 intervention   Response unavailable   Treatment Detail check in   Activities of Daily Living   Hygiene/Grooming prompts   Oral Hygiene independent   Dress independent   Room Organization prompts   Activity   Activity Level of Assistance independent

## 2017-09-03 PROCEDURE — 25000132 ZZH RX MED GY IP 250 OP 250 PS 637

## 2017-09-03 PROCEDURE — 25000132 ZZH RX MED GY IP 250 OP 250 PS 637: Performed by: PSYCHIATRY & NEUROLOGY

## 2017-09-03 PROCEDURE — 12400007 ZZH R&B MH INTERMEDIATE UMMC

## 2017-09-03 RX ADMIN — TRAZODONE HYDROCHLORIDE 50 MG: 50 TABLET ORAL at 00:40

## 2017-09-03 RX ADMIN — RISPERIDONE 3 MG: 3 TABLET ORAL at 21:31

## 2017-09-03 RX ADMIN — PROPRANOLOL HYDROCHLORIDE 10 MG: 10 TABLET ORAL at 19:03

## 2017-09-03 RX ADMIN — PROPRANOLOL HYDROCHLORIDE 10 MG: 10 TABLET ORAL at 00:39

## 2017-09-03 RX ADMIN — HYDROXYZINE HYDROCHLORIDE 50 MG: 25 TABLET ORAL at 16:56

## 2017-09-03 ASSESSMENT — ACTIVITIES OF DAILY LIVING (ADL)
GROOMING: INDEPENDENT
ORAL_HYGIENE: INDEPENDENT
DRESS: INDEPENDENT
DRESS: INDEPENDENT
ORAL_HYGIENE: INDEPENDENT
LAUNDRY: WITH SUPERVISION
GROOMING: INDEPENDENT

## 2017-09-03 NOTE — PROGRESS NOTES
09/02/17 2000   Behavioral Health   Hallucinations denies / not responding to hallucinations   Thinking distractable;poor concentration   Orientation person: oriented;place: oriented   Memory baseline memory   Insight admits / accepts;insight appropriate to situation   Judgement intact   Affect full range affect   Mood mood is calm   Physical Appearance/Attire attire appropriate to age and situation   Hygiene well groomed   Activity withdrawn   Speech clear;coherent   Psychomotor / Gait balanced;steady       Pt stayed in room except for meals, walked out briefly to check out the movie, then returned to bed.  Pt stated no SI/SIB, denied AH/VH, seemed calm but tired.

## 2017-09-04 PROCEDURE — 25000132 ZZH RX MED GY IP 250 OP 250 PS 637

## 2017-09-04 PROCEDURE — 25000132 ZZH RX MED GY IP 250 OP 250 PS 637: Performed by: PSYCHIATRY & NEUROLOGY

## 2017-09-04 PROCEDURE — 12400007 ZZH R&B MH INTERMEDIATE UMMC

## 2017-09-04 RX ADMIN — TRAZODONE HYDROCHLORIDE 50 MG: 50 TABLET ORAL at 01:28

## 2017-09-04 RX ADMIN — PROPRANOLOL HYDROCHLORIDE 10 MG: 10 TABLET ORAL at 08:59

## 2017-09-04 RX ADMIN — HYDROXYZINE HYDROCHLORIDE 50 MG: 25 TABLET ORAL at 15:07

## 2017-09-04 RX ADMIN — PROPRANOLOL HYDROCHLORIDE 10 MG: 10 TABLET ORAL at 01:28

## 2017-09-04 RX ADMIN — RISPERIDONE 3 MG: 3 TABLET ORAL at 21:08

## 2017-09-04 RX ADMIN — PROPRANOLOL HYDROCHLORIDE 10 MG: 10 TABLET ORAL at 18:47

## 2017-09-04 ASSESSMENT — ACTIVITIES OF DAILY LIVING (ADL)
DRESS: STREET CLOTHES;INDEPENDENT
GROOMING: INDEPENDENT
DRESS: STREET CLOTHES
LAUNDRY: WITH SUPERVISION
ORAL_HYGIENE: INDEPENDENT
GROOMING: INDEPENDENT;HANDWASHING;SHOWER

## 2017-09-04 NOTE — PROGRESS NOTES
09/03/17 2136   Behavioral Health   Hallucinations denies / not responding to hallucinations   Thinking distractable;poor concentration   Orientation person: oriented;place: oriented   Insight denial of illness;poor   Judgement impaired   Eye Contact at examiner   Affect blunted, flat   Mood mood is calm   Physical Appearance/Attire attire appropriate to age and situation   Hygiene neglected grooming - unclean body, hair, teeth   Suicidality (denies )   Self Injury (none)   Activity isolative;withdrawn   Speech clear;coherent   Psychomotor / Gait balanced;steady   Activities of Daily Living   Hygiene/Grooming independent   Oral Hygiene independent   Dress independent   Room Organization independent   Pt. Showed low profile on unit. Pt declined community meeting, isolative to room nearly all shift. Pt affect flat, socially withdrawn. Pt denies current MH issues. Pt denies any active SI or safety concerns at this time.

## 2017-09-04 NOTE — PROGRESS NOTES
09/04/17 1319   Behavioral Health   Hallucinations appears responding  (pt denies, but aprs to respnd when pt was out of room)   Thinking confused   Orientation time, disoriented;person, disoriented;place: oriented;date, disoriented   Memory baseline memory   Insight poor   Judgement impaired   Eye Contact into space   Affect blunted, flat;tense   Mood mood is calm   Physical Appearance/Attire disheveled   Hygiene well groomed   Suicidality other (see comments)  (none stated none observed)   Self Injury other (see comment)  (none stated none observed)   Activity isolative;withdrawn   Speech clear;coherent   Medication Sensitivity no stated side effects;no observed side effects   Psychomotor / Gait balanced;steady   Activities of Daily Living   Hygiene/Grooming independent;handwashing;shower   Oral Hygiene independent   Dress street clothes   Laundry with supervision   Room Organization independent   Behavioral Health Interventions   Psychotic Symptoms maintain safety precautions;monitor need to revise level of observation;maintain safe secure environment;reality orientation;simple, clear language;decrease environmental stimulation;redirection of intrusive behaviors;redirection of aggressive behaviors;assist patient in developing safety plan;assist patient in following safety plan;encourage nutrition and hydration;encourage participation / independence with adls;provide emotional support;establish therapeutic relationship;assist with developing & utilizing healthy coping strategies;build upon strengths   Social and Therapeutic Interventions (Psychotic Symptoms) encourage socialization with peers;encourage effective boundaries with peers;encourage participation in therapeutic groups and milieu activities     Pt slept most of the shift and only came out during lunch so far. Pt was guarded and short at check in. Pt denied auditory or visual hallucinations but shortly after checking in with pt, he appeared to be  responding to something, laughing and acknowledging a sound, but no one else was in the immediate area.  Pt went back to bed after lunch/check-in.

## 2017-09-04 NOTE — PROGRESS NOTES
1. What PRN did patient receive? propanolol    2. What was the patient doing that led to the PRN medication? Anxiety, restless     3. After 1 Hour, patient appeared: in bed, calm, napping on and of; stated that restless feeling had resolved.     4. Side effects to PRN medication? Pt denied any side effects

## 2017-09-05 PROCEDURE — 25000132 ZZH RX MED GY IP 250 OP 250 PS 637: Performed by: PSYCHIATRY & NEUROLOGY

## 2017-09-05 PROCEDURE — 12400007 ZZH R&B MH INTERMEDIATE UMMC

## 2017-09-05 PROCEDURE — 25000132 ZZH RX MED GY IP 250 OP 250 PS 637

## 2017-09-05 PROCEDURE — 99231 SBSQ HOSP IP/OBS SF/LOW 25: CPT | Mod: GC | Performed by: PSYCHIATRY & NEUROLOGY

## 2017-09-05 RX ADMIN — RISPERIDONE 3 MG: 3 TABLET ORAL at 20:24

## 2017-09-05 RX ADMIN — HYDROXYZINE HYDROCHLORIDE 50 MG: 25 TABLET ORAL at 18:13

## 2017-09-05 RX ADMIN — TRAZODONE HYDROCHLORIDE 50 MG: 50 TABLET ORAL at 22:04

## 2017-09-05 RX ADMIN — TRAZODONE HYDROCHLORIDE 50 MG: 50 TABLET ORAL at 20:24

## 2017-09-05 RX ADMIN — Medication 15 MG: at 13:09

## 2017-09-05 RX ADMIN — HYDROXYZINE HYDROCHLORIDE 50 MG: 25 TABLET ORAL at 22:04

## 2017-09-05 RX ADMIN — Medication 15 MG: at 20:24

## 2017-09-05 ASSESSMENT — ACTIVITIES OF DAILY LIVING (ADL)
ORAL_HYGIENE: INDEPENDENT
HYGIENE/GROOMING: INDEPENDENT
DRESS: INDEPENDENT;STREET CLOTHES
LAUNDRY: WITH SUPERVISION
ORAL_HYGIENE: INDEPENDENT
HYGIENE/GROOMING: INDEPENDENT
DRESS: INDEPENDENT
LAUNDRY: WITH SUPERVISION
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
## 2017-09-05 NOTE — PROGRESS NOTES
Isolative to room. Out for meals and phone calls. No shower.       09/05/17 1400   Behavioral Health   Hallucinations appears responding   Thinking distractable;poor concentration   Orientation situation, disoriented   Memory baseline memory   Insight poor   Judgement impaired   Affect blunted, flat   Mood depressed   Physical Appearance/Attire attire appropriate to age and situation;disheveled   Hygiene neglected grooming - unclean body, hair, teeth   Activity isolative;withdrawn;refusal   Speech word salad   Psychomotor / Gait balanced;steady   Psycho Education   Type of Intervention structured groups   Response refuses   Activities of Daily Living   Hygiene/Grooming independent   Oral Hygiene independent   Dress independent;street clothes   Laundry with supervision   Room Organization independent

## 2017-09-05 NOTE — PROGRESS NOTES
RE- [facility] assigned  - [facility]     This writer placed call to [facility] court social work group to coordinate as to current status of TCM referral. Per court social work staff patient is assigned [facility]  Linda Valverde ph: [phone].      This writer then called and spoke with Linda who just received assignment, briefly discussed case - at time of this note Linda is meeting with patient on unit to begin intake.     preliminary discussion between this writer and Linda regarding appropriate provisional discharge planning due to severity of presentation and noted worsening aggression - no insight into illness - exploration of IRTS placement is recommended. This writer did discuss with resident psychiatrist.

## 2017-09-06 PROCEDURE — 99231 SBSQ HOSP IP/OBS SF/LOW 25: CPT | Mod: GC | Performed by: PSYCHIATRY & NEUROLOGY

## 2017-09-06 PROCEDURE — 25000132 ZZH RX MED GY IP 250 OP 250 PS 637

## 2017-09-06 PROCEDURE — 12400007 ZZH R&B MH INTERMEDIATE UMMC

## 2017-09-06 RX ORDER — PROPRANOLOL HYDROCHLORIDE 20 MG/1
20 TABLET ORAL
Status: DISCONTINUED | OUTPATIENT
Start: 2017-09-06 | End: 2017-09-14 | Stop reason: HOSPADM

## 2017-09-06 RX ADMIN — RISPERIDONE 3 MG: 3 TABLET ORAL at 21:40

## 2017-09-06 RX ADMIN — Medication 15 MG: at 17:48

## 2017-09-06 ASSESSMENT — ACTIVITIES OF DAILY LIVING (ADL)
LAUNDRY: WITH SUPERVISION
GROOMING: PROMPTS
DRESS: INDEPENDENT
ORAL_HYGIENE: INDEPENDENT

## 2017-09-06 NOTE — PLAN OF CARE
Problem: Psychotic Symptoms  Goal: Psychotic Symptoms  Signs and symptoms of listed problems will be absent or manageable.   Outcome: No Change  Patient isolates to room all shift. Comes out only for meals. He remains disheveled. Minimal interaction with staff. Patient is med compliant. Bridget Sands RN
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
## 2017-09-06 NOTE — PROGRESS NOTES
Isolative to room. Out for meals. Not social in the milieu. Presents responding to internal stim, freq distracted. Short requests and responses.       09/05/17 2000   Behavioral Health   Hallucinations appears responding   Thinking distractable;paranoid   Orientation person: oriented;place: oriented   Memory baseline memory   Insight poor   Judgement impaired   Eye Contact at examiner;at floor   Affect blunted, flat   Mood depressed   Physical Appearance/Attire attire appropriate to age and situation   Hygiene neglected grooming - unclean body, hair, teeth   Activity isolative;withdrawn   Speech clear;coherent   Psychomotor / Gait balanced;steady   Activities of Daily Living   Hygiene/Grooming independent   Oral Hygiene independent   Dress independent   Laundry with supervision   Room Organization independent

## 2017-09-07 PROCEDURE — 99231 SBSQ HOSP IP/OBS SF/LOW 25: CPT | Mod: GC | Performed by: PSYCHIATRY & NEUROLOGY

## 2017-09-07 PROCEDURE — 25000132 ZZH RX MED GY IP 250 OP 250 PS 637

## 2017-09-07 PROCEDURE — 12400007 ZZH R&B MH INTERMEDIATE UMMC

## 2017-09-07 PROCEDURE — 25000132 ZZH RX MED GY IP 250 OP 250 PS 637: Performed by: PSYCHIATRY & NEUROLOGY

## 2017-09-07 RX ADMIN — RISPERIDONE 3 MG: 3 TABLET ORAL at 20:47

## 2017-09-07 RX ADMIN — Medication 15 MG: at 18:21

## 2017-09-07 RX ADMIN — PROPRANOLOL HYDROCHLORIDE 20 MG: 20 TABLET ORAL at 22:13

## 2017-09-07 RX ADMIN — HYDROXYZINE HYDROCHLORIDE 50 MG: 25 TABLET ORAL at 20:47

## 2017-09-07 ASSESSMENT — ACTIVITIES OF DAILY LIVING (ADL)
DRESS: INDEPENDENT
DRESS: INDEPENDENT
LAUNDRY: WITH SUPERVISION
ORAL_HYGIENE: INDEPENDENT
GROOMING: INDEPENDENT
GROOMING: INDEPENDENT
ORAL_HYGIENE: INDEPENDENT
LAUNDRY: WITH SUPERVISION

## 2017-09-07 NOTE — PROGRESS NOTES
Pt was isolative to room for shift. Minimal interaction with staff, came out for meal, did not shower or attend group. Pt denies any illness or symptoms. Appears possibly distracted by internal thoughts or stimuli, but not reacting in an overt manner.      09/06/17 2200   Behavioral Health   Hallucinations denies / not responding to hallucinations  (possibly)   Thinking distractable   Orientation person: oriented;place: oriented;date: oriented;time: oriented   Memory baseline memory   Insight denial of illness;poor   Judgement impaired   Eye Contact at examiner   Affect blunted, flat   Mood mood is calm   Physical Appearance/Attire disheveled   Hygiene other (see comment)  (fair)   Suicidality other (see comments)  (denies)   Self Injury other (see comment)  (denies)   Activity isolative   Speech clear;coherent   Medication Sensitivity no observed side effects   Psychomotor / Gait balanced;steady   Psycho Education   Type of Intervention 1:1 intervention   Response participates with encouragement   Hours 0.5   Treatment Detail check in    Group Therapy Session   Group Attendance refused to attend group session   Activities of Daily Living   Hygiene/Grooming prompts   Oral Hygiene independent   Dress independent   Laundry with supervision   Room Organization independent   Activity   Activity Level of Assistance independent
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
## 2017-09-07 NOTE — PLAN OF CARE
Problem: Psychotic Symptoms  Goal: Psychotic Symptoms  Signs and symptoms of listed problems will be absent or manageable.   Outcome: Improving    09/07/17 1152   Psychotic Symptoms   Psychotic Symptoms Assessed all   Psychotic Symptoms Present none   48 hour nursing assessment:  Patient evaluation continues. Assessed mood,anxiety,thoughts and behavior. Is progressing towards goals. Encourage participation in groups and developing healthy coping skills. Will continue to assess. Patient denies auditory or visual  Hallucinations. Refer to daily team meeting notes for individualized plan of care.  Pt denies SI/SIB/HI/Hallucinations. Anxiety 0/10 and depression 0/10. Pt has been isolative all shift and states he is waiting to discharge. Pt stated he is bored and the groups we offer are not interesting to him. His main focus is to discharge. Pt feels he would be fine to d/c home and stated he is waiting to hear from team if that is able to happen. Pt stated he will be safe at home and will continue to take medications. We spoke about the importance of medications.

## 2017-09-07 NOTE — PLAN OF CARE
Vs 30 minutes after propranolol given with good results for restless legs,sitting 114/72,[id number]

## 2017-09-08 PROCEDURE — 99231 SBSQ HOSP IP/OBS SF/LOW 25: CPT | Mod: GC | Performed by: PSYCHIATRY & NEUROLOGY

## 2017-09-08 PROCEDURE — 25000132 ZZH RX MED GY IP 250 OP 250 PS 637

## 2017-09-08 PROCEDURE — 12400007 ZZH R&B MH INTERMEDIATE UMMC

## 2017-09-08 PROCEDURE — 25000132 ZZH RX MED GY IP 250 OP 250 PS 637: Performed by: PSYCHIATRY & NEUROLOGY

## 2017-09-08 RX ADMIN — TRAZODONE HYDROCHLORIDE 50 MG: 50 TABLET ORAL at 22:07

## 2017-09-08 RX ADMIN — Medication 15 MG: at 17:43

## 2017-09-08 RX ADMIN — RISPERIDONE 3 MG: 3 TABLET ORAL at 21:04

## 2017-09-08 RX ADMIN — Medication 15 MG: at 23:08

## 2017-09-08 RX ADMIN — PROPRANOLOL HYDROCHLORIDE 20 MG: 20 TABLET ORAL at 21:04

## 2017-09-08 ASSESSMENT — ACTIVITIES OF DAILY LIVING (ADL)
HYGIENE/GROOMING: INDEPENDENT
HYGIENE/GROOMING: INDEPENDENT
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
## 2017-09-08 NOTE — PROGRESS NOTES
Isolative to room sleeping much of shift. Out for meals only.       09/08/17 1400   Behavioral Health   Hallucinations appears responding   Thinking poor concentration;distractable   Orientation person: oriented;place: oriented   Insight poor   Judgement impaired   Eye Contact at examiner   Affect blunted, flat   Physical Appearance/Attire disheveled   Hygiene neglected grooming - unclean body, hair, teeth   Activity isolative;withdrawn   Speech clear   Psychomotor / Gait balanced;steady;slow   Psycho Education   Type of Intervention structured groups   Response refuses   Activities of Daily Living   Hygiene/Grooming independent  (Declined)

## 2017-09-08 NOTE — PROGRESS NOTES
Pt was present in the milieu but was rather isolative and kept mostly to himself. The pt was observed eating meals and watching TV in common areas or resting in his room. The pt did not attend the community meeting but did comply to a staff check-in. During the check-in the pt denied all mental health symptoms except anxiety (4 on a scale to 10). He attributed these feelings to his release for the hospital and not knowing where he will go next. The pt stated he was in a good mood and presented in a calm manner.        09/07/17 2057   Behavioral Health   Hallucinations denies / not responding to hallucinations   Thinking poor concentration;intact   Orientation person: oriented;place: oriented;date: oriented;time: oriented   Memory baseline memory   Insight poor   Judgement intact   Eye Contact at examiner   Affect blunted, flat   Mood mood is calm   Physical Appearance/Attire disheveled;attire appropriate to age and situation   Hygiene neglected grooming - unclean body, hair, teeth   Suicidality (denies urges)   Self Injury (denies urges)   Elopement (No apparent risk)   Activity isolative   Speech clear;coherent   Medication Sensitivity no stated side effects;no observed side effects   Psychomotor / Gait slow;balanced;steady   Psycho Education   Type of Intervention 1:1 intervention   Response participates with encouragement   Hours 0.5   Treatment Detail (Check-in)   Activities of Daily Living   Hygiene/Grooming independent   Oral Hygiene independent   Dress independent   Laundry with supervision   Room Organization independent   Activity   Activity Level of Assistance independent

## 2017-09-09 PROCEDURE — 25000132 ZZH RX MED GY IP 250 OP 250 PS 637: Performed by: PSYCHIATRY & NEUROLOGY

## 2017-09-09 PROCEDURE — 12400007 ZZH R&B MH INTERMEDIATE UMMC

## 2017-09-09 PROCEDURE — 25000132 ZZH RX MED GY IP 250 OP 250 PS 637

## 2017-09-09 RX ADMIN — RISPERIDONE 3 MG: 3 TABLET ORAL at 21:42

## 2017-09-09 RX ADMIN — TRAZODONE HYDROCHLORIDE 50 MG: 50 TABLET ORAL at 00:01

## 2017-09-09 RX ADMIN — HYDROXYZINE HYDROCHLORIDE 50 MG: 25 TABLET ORAL at 02:49

## 2017-09-09 RX ADMIN — PROPRANOLOL HYDROCHLORIDE 20 MG: 20 TABLET ORAL at 21:42

## 2017-09-09 RX ADMIN — HYDROXYZINE HYDROCHLORIDE 50 MG: 25 TABLET ORAL at 16:17

## 2017-09-09 RX ADMIN — TRAZODONE HYDROCHLORIDE 50 MG: 50 TABLET ORAL at 21:42

## 2017-09-09 ASSESSMENT — ACTIVITIES OF DAILY LIVING (ADL)
LAUNDRY: WITH SUPERVISION
DRESS: INDEPENDENT
GROOMING: INDEPENDENT
ORAL_HYGIENE: INDEPENDENT

## 2017-09-09 NOTE — PROGRESS NOTES
09/09/17 1415   Behavioral Health   Hallucinations denies / not responding to hallucinations   Thinking poor concentration   Orientation place: oriented;person: oriented   Insight poor   Judgement impaired   Eye Contact drooping   Affect blunted, flat   Mood mood is calm   Physical Appearance/Attire attire appropriate to age and situation   Hygiene well groomed   Suicidality other (see comments)  (denies)   Self Injury other (see comment)  (Denies it)   Activity withdrawn   Speech other (see comments)  (Dragging his words)   Psychomotor / Gait balanced      Pt was mostly in his room asleep for the first hours of the shift. Came out of room in the afternoon. Affect has been blunt. Appearance is appropriate in his own attire. Observed to pace around unit. During 1:1, pt stated he is hoping he will go home, but would be accepting of an [facility] facility. Denies all psych symptoms. Contracts for safety.

## 2017-09-09 NOTE — PROGRESS NOTES
Isolative to room. Out only for meals. Not social. Appears responding in his room.       09/08/17 2100   Behavioral Health   Hallucinations appears responding   Thinking poor concentration;distractable   Orientation person: oriented;place: oriented   Insight poor   Judgement impaired   Eye Contact at examiner   Affect blunted, flat   Physical Appearance/Attire disheveled   Hygiene neglected grooming - unclean body, hair, teeth   Activity isolative;withdrawn   Speech clear   Psychomotor / Gait balanced;steady   Psycho Education   Type of Intervention structured groups   Response refuses   Activities of Daily Living   Hygiene/Grooming independent  (Declined)

## 2017-09-10 PROCEDURE — 25000132 ZZH RX MED GY IP 250 OP 250 PS 637: Performed by: PSYCHIATRY & NEUROLOGY

## 2017-09-10 PROCEDURE — 25000132 ZZH RX MED GY IP 250 OP 250 PS 637

## 2017-09-10 PROCEDURE — 12400007 ZZH R&B MH INTERMEDIATE UMMC

## 2017-09-10 RX ADMIN — PROPRANOLOL HYDROCHLORIDE 20 MG: 20 TABLET ORAL at 23:49

## 2017-09-10 RX ADMIN — RISPERIDONE 3 MG: 3 TABLET ORAL at 20:33

## 2017-09-10 RX ADMIN — HYDROXYZINE HYDROCHLORIDE 50 MG: 25 TABLET ORAL at 20:33

## 2017-09-10 RX ADMIN — HYDROXYZINE HYDROCHLORIDE 50 MG: 25 TABLET ORAL at 15:30

## 2017-09-10 ASSESSMENT — ACTIVITIES OF DAILY LIVING (ADL)
LAUNDRY: UNABLE TO COMPLETE
LAUNDRY: WITH SUPERVISION
GROOMING: INDEPENDENT
DRESS: STREET CLOTHES
ORAL_HYGIENE: INDEPENDENT
DRESS: STREET CLOTHES
ORAL_HYGIENE: INDEPENDENT
GROOMING: INDEPENDENT
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
## 2017-09-10 NOTE — PLAN OF CARE
Problem: General Plan of Care (Inpatient Behavioral)  Goal: Individualization/Patient Specific Goal (IP Behavioral)  The patient and/or their representative will achieve their patient-specific goals related to the plan of care.    The patient-specific goals include:   Illness Management Recovery model:  Ways to [place].     Patient identified the following specific strategies to cope with their symptoms:     1  2.3    Comments:   No imr discussion

## 2017-09-11 PROCEDURE — 25000132 ZZH RX MED GY IP 250 OP 250 PS 637: Performed by: PSYCHIATRY & NEUROLOGY

## 2017-09-11 PROCEDURE — 25000128 H RX IP 250 OP 636

## 2017-09-11 PROCEDURE — 12400007 ZZH R&B MH INTERMEDIATE UMMC

## 2017-09-11 PROCEDURE — 25000132 ZZH RX MED GY IP 250 OP 250 PS 637

## 2017-09-11 PROCEDURE — 99231 SBSQ HOSP IP/OBS SF/LOW 25: CPT | Mod: GC | Performed by: PSYCHIATRY & NEUROLOGY

## 2017-09-11 RX ADMIN — RISPERIDONE 3 MG: 3 TABLET ORAL at 20:39

## 2017-09-11 RX ADMIN — ACETAMINOPHEN 650 MG: 325 TABLET, FILM COATED ORAL at 20:01

## 2017-09-11 RX ADMIN — TRAZODONE HYDROCHLORIDE 50 MG: 50 TABLET ORAL at 02:15

## 2017-09-11 RX ADMIN — TRAZODONE HYDROCHLORIDE 50 MG: 50 TABLET ORAL at 01:10

## 2017-09-11 RX ADMIN — PROPRANOLOL HYDROCHLORIDE 20 MG: 20 TABLET ORAL at 20:39

## 2017-09-11 RX ADMIN — HYDROXYZINE HYDROCHLORIDE 50 MG: 25 TABLET ORAL at 18:35

## 2017-09-11 RX ADMIN — RISPERIDONE 25 MG: KIT at 16:29

## 2017-09-11 RX ADMIN — HYDROXYZINE HYDROCHLORIDE 50 MG: 25 TABLET ORAL at 02:14

## 2017-09-11 ASSESSMENT — ACTIVITIES OF DAILY LIVING (ADL)
ORAL_HYGIENE: INDEPENDENT
DRESS: INDEPENDENT
HYGIENE/GROOMING: INDEPENDENT
GROOMING: INDEPENDENT

## 2017-09-11 NOTE — PROGRESS NOTES
Isolative to room, sleeping much of shift. Out for meals and meds only.     09/11/17 1300   Behavioral Health   Hallucinations denies / not responding to hallucinations   Thinking distractable;poor concentration   Orientation person: oriented;place: oriented;date: oriented;time: oriented   Memory baseline memory   Insight poor   Judgement impaired   Eye Contact at floor;out of corner of eyes   Affect blunted, flat   Mood mood is calm   Physical Appearance/Attire disheveled   Hygiene neglected grooming - unclean body, hair, teeth   Activity isolative;other (see comment)  (Slept much of shift)   Speech coherent;other (see comments)  (Quiet, mumbles at times)   Psychomotor / Gait balanced;steady   Psycho Education   Type of Intervention structured groups   Response refuses   Activities of Daily Living   Hygiene/Grooming independent  (Declined)

## 2017-09-11 NOTE — PROGRESS NOTES
The pt was present but socially isolative with peers throughout the shift. The pt was observed eating dinner, watching TV, and socializing with visitors. The pt had a notably blunt affect but could be seen smiling to himself while pacing in the hallways.        09/10/17 2036   Behavioral Health   Hallucinations denies / not responding to hallucinations   Thinking distractable;poor concentration   Orientation person: oriented;place: oriented;date: oriented;time: oriented   Memory baseline memory   Insight poor   Judgement impaired   Eye Contact at floor;at examiner   Affect blunted, flat;incongruent   Mood mood is calm   Physical Appearance/Attire disheveled;attire appropriate to age and situation   Hygiene neglected grooming - unclean body, hair, teeth   Suicidality (Pt did not report)   Self Injury (Pt did not report)   Elopement (No apparent risk)   Activity isolative   Speech clear;coherent   Medication Sensitivity no stated side effects;no observed side effects   Psychomotor / Gait paces;balanced;steady   Activities of Daily Living   Hygiene/Grooming independent   Oral Hygiene independent   Dress street clothes   Laundry with supervision   Room Organization independent   Activity   Activity Level of Assistance independent
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
## 2017-09-12 VITALS
BODY MASS INDEX: 29.35 KG/M2 | HEIGHT: 67 IN | HEART RATE: 88 BPM | TEMPERATURE: 97.3 F | WEIGHT: 187 LBS | SYSTOLIC BLOOD PRESSURE: 120 MMHG | OXYGEN SATURATION: 95 % | DIASTOLIC BLOOD PRESSURE: 72 MMHG | RESPIRATION RATE: 16 BRPM

## 2017-09-12 PROCEDURE — 99231 SBSQ HOSP IP/OBS SF/LOW 25: CPT | Mod: GC | Performed by: PSYCHIATRY & NEUROLOGY

## 2017-09-12 PROCEDURE — 25000132 ZZH RX MED GY IP 250 OP 250 PS 637

## 2017-09-12 PROCEDURE — 25000132 ZZH RX MED GY IP 250 OP 250 PS 637: Performed by: PSYCHIATRY & NEUROLOGY

## 2017-09-12 PROCEDURE — 12400007 ZZH R&B MH INTERMEDIATE UMMC

## 2017-09-12 RX ORDER — RISPERIDONE 1 MG/1
1 TABLET ORAL 4 TIMES DAILY PRN
Status: DISCONTINUED | OUTPATIENT
Start: 2017-09-12 | End: 2017-09-14 | Stop reason: HOSPADM

## 2017-09-12 RX ADMIN — RISPERIDONE 3 MG: 3 TABLET ORAL at 20:27

## 2017-09-12 RX ADMIN — TRAZODONE HYDROCHLORIDE 50 MG: 50 TABLET ORAL at 22:14

## 2017-09-12 RX ADMIN — TRAZODONE HYDROCHLORIDE 50 MG: 50 TABLET ORAL at 22:12

## 2017-09-12 RX ADMIN — PROPRANOLOL HYDROCHLORIDE 20 MG: 20 TABLET ORAL at 20:31

## 2017-09-12 RX ADMIN — Medication 15 MG: at 17:15

## 2017-09-12 ASSESSMENT — ACTIVITIES OF DAILY LIVING (ADL): GROOMING: INDEPENDENT
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
## 2017-09-12 NOTE — PLAN OF CARE
"Problem: Psychotic Symptoms  Goal: Psychotic Symptoms  Signs and symptoms of listed problems will be absent or manageable.   Outcome: No Change  Pt presents as flat and blunted and reports feeling \"fine.\" Pt was in his room until about supper and then was either watching TV of pacing in halls, Pt is minimally social w/ others but is appropriate, appears guarded in conversation. Pt denies any concerns, denies anxiety, hearing voices or suicidal thoughts. Pt reports feeling tired which he attributes to medications. Pt received his risperdal consta IM this shift.      "

## 2017-09-12 NOTE — PROGRESS NOTES
09/12/17 1400   Behavioral Health   Hallucinations denies / not responding to hallucinations   Thinking poor concentration   Orientation person: oriented;place: oriented;date: oriented;time: oriented   Memory baseline memory   Insight poor   Judgement impaired   Eye Contact into space;staring   Affect blunted, flat   Mood mood is calm;anxious   Physical Appearance/Attire untidy   Hygiene neglected grooming - unclean body, hair, teeth   Suicidality other (see comments)  (none stated or observed)   Self Injury other (see comment)  (none stated or observed)   Elopement (none stated or observed)   Activity isolative;withdrawn   Speech coherent   Medication Sensitivity no stated side effects   Psychomotor / Gait slow;balanced     Pt was isolative and withdrawn for the majority of the shift, occasionally walking in the [place]gs in the milieu. His affect was flat and blunted and he did not attend structured groups during this shift. He denied any depression or anxiety.

## 2017-09-13 PROCEDURE — 25000132 ZZH RX MED GY IP 250 OP 250 PS 637: Performed by: PSYCHIATRY & NEUROLOGY

## 2017-09-13 PROCEDURE — 25000132 ZZH RX MED GY IP 250 OP 250 PS 637

## 2017-09-13 PROCEDURE — 12400001 ZZH R&B MH UMMC

## 2017-09-13 PROCEDURE — 99231 SBSQ HOSP IP/OBS SF/LOW 25: CPT | Mod: GC | Performed by: PSYCHIATRY & NEUROLOGY

## 2017-09-13 PROCEDURE — 90853 GROUP PSYCHOTHERAPY: CPT

## 2017-09-13 RX ORDER — RISPERIDONE 1 MG/1
1 TABLET ORAL 4 TIMES DAILY PRN
Qty: 10 TABLET | Refills: 0 | Status: SHIPPED | OUTPATIENT
Start: 2017-09-13

## 2017-09-13 RX ORDER — RISPERIDONE 3 MG/1
3 TABLET ORAL AT BEDTIME
Qty: 21 TABLET | Refills: 0 | Status: SHIPPED | OUTPATIENT
Start: 2017-09-13 | End: 2017-10-04

## 2017-09-13 RX ORDER — PROPRANOLOL HYDROCHLORIDE 20 MG/1
20 TABLET ORAL
Qty: 30 TABLET | Refills: 0 | Status: SHIPPED | OUTPATIENT
Start: 2017-09-13

## 2017-09-13 RX ORDER — PROPRANOLOL HYDROCHLORIDE 60 MG/1
15 TABLET ORAL 2 TIMES DAILY PRN
Qty: 30 TABLET | Refills: 0 | Status: SHIPPED | OUTPATIENT
Start: 2017-09-13

## 2017-09-13 RX ORDER — TRAZODONE HYDROCHLORIDE 50 MG/1
50 TABLET, FILM COATED ORAL
Qty: 30 TABLET | Refills: 0 | Status: SHIPPED | OUTPATIENT
Start: 2017-09-13

## 2017-09-13 RX ORDER — HYDROXYZINE HYDROCHLORIDE 25 MG/1
25-50 TABLET, FILM COATED ORAL EVERY 4 HOURS PRN
Qty: 60 TABLET | Refills: 0 | Status: SHIPPED | OUTPATIENT
Start: 2017-09-13

## 2017-09-13 RX ADMIN — Medication 15 MG: at 21:15

## 2017-09-13 RX ADMIN — RISPERIDONE 3 MG: 3 TABLET ORAL at 21:16

## 2017-09-13 RX ADMIN — HYDROXYZINE HYDROCHLORIDE 50 MG: 25 TABLET ORAL at 00:45

## 2017-09-13 RX ADMIN — HYDROXYZINE HYDROCHLORIDE 50 MG: 25 TABLET ORAL at 10:40

## 2017-09-13 ASSESSMENT — ACTIVITIES OF DAILY LIVING (ADL)
ORAL_HYGIENE: INDEPENDENT
DRESS: INDEPENDENT
GROOMING: INDEPENDENT
LAUNDRY: WITH SUPERVISION
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
## 2017-09-13 NOTE — PROGRESS NOTES
Psychiatry Follow Up -     Wednesday September 20, 2017 - 1:00pm - [facility] [place] [facility]    patient is scheduled for above new patient intake appointment

## 2017-09-13 NOTE — PROGRESS NOTES
Typical day. Keeps to self. Visible at meals then returns to room. No groups. Mostly pleasant when approached. Offers no complaints.         09/13/17 1303   Behavioral Health   Hallucinations denies / not responding to hallucinations   Thinking distractable;poor concentration   Orientation person: oriented;place: oriented;date: oriented;time: oriented   Memory baseline memory   Insight (Improved)   Judgement impaired   Affect blunted, flat   Mood mood is calm   Physical Appearance/Attire attire appropriate to age and situation;neat   Hygiene well groomed   Activity withdrawn   Speech coherent;clear   Safety   Suicidality status 15

## 2017-09-13 NOTE — PLAN OF CARE
Problem: Psychotic Symptoms  Intervention: Social and Therapeutic Interv (Psychotic Symptoms)     Occupational Therapy:  Pt was present for presentation on recovery in mental illness, followed by opportunity for discussion and was provided resources for follow-up.  Quiet but attentive throughout session.  OT staff will continue to encourage further group attendance to assess needs and address goals on plan of care.

## 2017-09-13 NOTE — PROGRESS NOTES
Psychiatry follow up - inquiring with Charley Alcaraz - [facility] [place] - [address] [place] [address]  Phone: [phone] Fax: [phone]    Two new patient intake appointments were scheduled for patient since his February encounter at [facility] as patient had not seen Dr. Alcaraz since 2013. The first scheduled intake appointment patient canceled and then it was rescheduled -       At time of this phone call per scheduling who confirmed with intake all available intake appointments are filled - new appointments times are given to intake on Monday mornings she reports to call Monday am at 8:30am .

## 2017-09-13 NOTE — PROGRESS NOTES
"   09/12/17 1952   Behavioral Health   Hallucinations denies / not responding to hallucinations   Thinking distractable   Insight insight appropriate to situation   Judgement intact   Eye Contact at examiner   Affect blunted, flat   Mood mood is calm   Physical Appearance/Attire disheveled   Hygiene neglected grooming - unclean body, hair, teeth   Activity isolative   Speech clear;coherent   Psycho Education   Type of Intervention 1:1 intervention   Response participates, initiates socially appropriate   Activities of Daily Living   Hygiene/Grooming independent   Pt is visible in milieu watching tv, isolative to self. Pt presents as disheveled and unkempt. Pt affect is flat. He is pleasant and cooperative on approach. Pt denies SI and hallucinations. Pt endorsing \"leg twitching.\"  Pt is encouraged that he has possible placement at a facility and discharging soon.  "

## 2017-09-14 PROCEDURE — 25000132 ZZH RX MED GY IP 250 OP 250 PS 637: Performed by: PSYCHIATRY & NEUROLOGY

## 2017-09-14 PROCEDURE — 25000132 ZZH RX MED GY IP 250 OP 250 PS 637

## 2017-09-14 PROCEDURE — 99238 HOSP IP/OBS DSCHRG MGMT 30/<: CPT | Mod: GC | Performed by: PSYCHIATRY & NEUROLOGY

## 2017-09-14 RX ADMIN — HYDROXYZINE HYDROCHLORIDE 50 MG: 25 TABLET ORAL at 00:33

## 2017-09-14 RX ADMIN — TRAZODONE HYDROCHLORIDE 50 MG: 50 TABLET ORAL at 00:33

## 2017-09-14 RX ADMIN — Medication 15 MG: at 09:37
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
## 2017-09-14 NOTE — PROGRESS NOTES
RE - Legal - commitment /Jarivs - Provisional Discharge Agreement     This met with patient and discussed conditions of Provisional Discharge, [facility]  Linda Valverde in support of plan was unable to be present however will co sign and fax back to this writer. Once received back will submit to courts. This writer will update note at that time.

## 2017-09-14 NOTE — PLAN OF CARE
Problem: General Plan of Care (Inpatient Behavioral)  Goal: Individualization/Patient Specific Goal (IP Behavioral)  The patient and/or their representative will achieve their patient-specific goals related to the plan of care.    The patient-specific goals include:   Illness Management Recovery model:  Warning Signs.     Patient identified the following early warning signs which may indicate that a relapse of their illness is startin.2.3

## 2017-09-14 NOTE — DISCHARGE INSTRUCTIONS
Behavioral Discharge Planning and Instructions      Summary:  You were admitted on 8/12/2017  due to Psychotic Symptomology.  You were admitted to station 12 and transferred to station 22 on 8/26/17 for ongoing treatment and discharge planning.  You discharged on 9/14/17 from Station 22 to [facility]    While hospitalized a petition for commitment as a person with mental illness and a petition to authorize treatment with neuroleptics (Alfredo) was filed with [facility]. On August 31, 2017 the court issued and order for committment and Alfredo. Today you will discharge with a Provisional Discharge Agreement which is effective for the duration of your commitment order - which is to last minimally until 2/28/18 unless this date is changed by the courts prior to.     Principal Diagnosis: Psychosis NEC      Health Care Follow-up Appointments:       Thursday September 14, 2017 - [facility] - Intensive Residential Treatment Services ([facility])  [address] [address] phone: [phone] fax: [phone]      Wednesday September 20, 2017 - 1:00pm Psychiatry  - Bob Spivey, PMHNP -BS  [facility] - [address] [place] [address]  Phone:[phone]   Fax: [phone]      LUCIA Fay [facility]       Phone:[phone]   Fax: [phone]   Continue working with your  for ongoing access to resources, mental health goals and to monitor your commitment.       [facility] ([place]) Appointment Transportation Service   [facility] phone: [phone]    Attend all scheduled appointments with your outpatient providers. Call at least 24 hours in advance if you need to reschedule an appointment to ensure continued access to your outpatient providers.   Major Treatments, Procedures and Findings:  You were provided with: a psychiatric assessment, medication evaluation and/or management, group therapy,  milieu management and medical interventions    Symptoms to Report: feeling more aggressive, increased confusion, losing more sleep, mood getting worse or thoughts of suicide    Early warning signs can include: increased depression or anxiety sleep disturbances increased thoughts or behaviors of suicide or self-harm  increased unusual thinking, such as paranoia or hearing voices    Safety and Wellness:  Take all medicines as directed.  Make no changes unless your doctor suggests them.      Follow treatment recommendations.  Refrain from alcohol and non-prescribed drugs.  If there is a concern for safety, call 911.    Resources:   Crisis Intervention: [phone] or [phone] (TTY: [phone]).  Call anytime for help.  [facility] Crisis (COPE) Response - Adult [phone]    The treatment team has appreciated the opportunity to work with you.     If you have any questions or concerns our unit number is [phone]

## 2017-09-14 NOTE — PROGRESS NOTES
[facility] Relapse Prevention Plan  This writer met with patient to discuss and complete relapse prevention plan.

## 2017-09-14 NOTE — PROGRESS NOTES
Discharge Note  Patient discharged to [facility], accompanied by his parents. Affect is flat. Mood is stable. He participated in discussion of discharge instructions. Patient has been med compliant and discharge meds were sent with him.  He denies SI and SIB. Bridget Sands RN

## 2017-09-14 NOTE — PROGRESS NOTES
Family meeting held with parents and treatment team., see physician note as well. Discussed summary of care and ongoing recommendations including follow up scheduled. Discussed Provisional Discharge as well. Parents are accompanying patient to IRTS for admission today. Are informed about his intake for psychiatry services next week.
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
## 2019-09-09 NOTE — PROGRESS NOTES
"Pt has been sitting in the milieu for much of the evening, however he is sitting in the staff designated area and declines to move. Appears tense and withdrawn. Guarded and declines to check in with staff, stating that he's \"fine.\" Refuses all medication and appears anxious when writer attempts to discuss medication with him. No aggressive behaviors observed this evening. Will continue to monitor closely for safety.  " 2 hours
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
